# Patient Record
Sex: MALE | Race: WHITE | Employment: OTHER | URBAN - METROPOLITAN AREA
[De-identification: names, ages, dates, MRNs, and addresses within clinical notes are randomized per-mention and may not be internally consistent; named-entity substitution may affect disease eponyms.]

---

## 2019-08-13 ENCOUNTER — HOSPITAL ENCOUNTER (OUTPATIENT)
Dept: CT IMAGING | Facility: HOSPITAL | Age: 74
Discharge: HOME | End: 2019-08-13
Attending: FAMILY MEDICINE
Payer: MEDICARE

## 2019-08-13 ENCOUNTER — HOSPITAL ENCOUNTER (OUTPATIENT)
Dept: GENERAL RADIOLOGY | Facility: HOSPITAL | Age: 74
Discharge: HOME | End: 2019-08-13
Attending: FAMILY MEDICINE
Payer: MEDICARE

## 2019-08-13 DIAGNOSIS — M25.551 RIGHT HIP PAIN: ICD-10-CM

## 2019-08-13 DIAGNOSIS — R22.0 MANDIBULAR MASS: ICD-10-CM

## 2019-08-13 DIAGNOSIS — M48.07 SPINAL STENOSIS OF LUMBOSACRAL REGION: ICD-10-CM

## 2019-08-13 DIAGNOSIS — M54.50 ACUTE LOW BACK PAIN: ICD-10-CM

## 2019-08-13 DIAGNOSIS — R22.1 LOCALIZED SWELLING, MASS AND LUMP, NECK: ICD-10-CM

## 2019-08-13 LAB — CREAT BLD-MCNC: 1.1 MG/DL (ref 0.7–1.3)

## 2019-08-13 PROCEDURE — 70491 CT SOFT TISSUE NECK W/DYE: CPT | Performed by: FAMILY MEDICINE

## 2019-08-13 PROCEDURE — 73502 X-RAY EXAM HIP UNI 2-3 VIEWS: CPT | Performed by: FAMILY MEDICINE

## 2019-08-13 PROCEDURE — 72110 X-RAY EXAM L-2 SPINE 4/>VWS: CPT | Performed by: FAMILY MEDICINE

## 2019-08-13 PROCEDURE — 82565 ASSAY OF CREATININE: CPT

## 2019-08-15 PROBLEM — D12.3 BENIGN NEOPLASM OF TRANSVERSE COLON: Status: ACTIVE | Noted: 2019-08-15

## 2019-08-15 PROBLEM — K63.5 POLYP, SIGMOID COLON: Status: ACTIVE | Noted: 2019-08-15

## 2019-08-15 PROBLEM — D12.0 BENIGN NEOPLASM OF CECUM: Status: ACTIVE | Noted: 2019-08-15

## 2019-08-15 PROBLEM — D12.2 BENIGN NEOPLASM OF ASCENDING COLON: Status: ACTIVE | Noted: 2019-08-15

## 2019-08-15 PROBLEM — Z86.010 PERSONAL HISTORY OF COLONIC POLYPS: Status: ACTIVE | Noted: 2019-08-15

## 2019-11-13 ENCOUNTER — HOSPITAL ENCOUNTER (OUTPATIENT)
Dept: CV DIAGNOSTICS | Facility: HOSPITAL | Age: 74
Discharge: HOME | End: 2019-11-13
Attending: FAMILY MEDICINE
Payer: MEDICARE

## 2019-11-13 DIAGNOSIS — E78.5 HYPERLIPIDEMIA, UNSPECIFIED: ICD-10-CM

## 2019-11-13 DIAGNOSIS — R94.31 ABNORMAL EKG: ICD-10-CM

## 2019-11-13 PROCEDURE — 93018 CV STRESS TEST I&R ONLY: CPT | Performed by: FAMILY MEDICINE

## 2019-11-13 PROCEDURE — 93306 TTE W/DOPPLER COMPLETE: CPT | Performed by: FAMILY MEDICINE

## 2019-11-13 PROCEDURE — 93017 CV STRESS TEST TRACING ONLY: CPT | Performed by: FAMILY MEDICINE

## 2019-11-13 PROCEDURE — 78452 HT MUSCLE IMAGE SPECT MULT: CPT | Performed by: FAMILY MEDICINE

## 2019-11-15 ENCOUNTER — TELEPHONE (OUTPATIENT)
Dept: CARDIOLOGY | Age: 74
End: 2019-11-15

## 2019-11-21 RX ORDER — SIMVASTATIN 40 MG
40 TABLET ORAL
COMMUNITY
Start: 2016-06-07

## 2019-11-21 RX ORDER — METOPROLOL SUCCINATE 25 MG/1
25 TABLET, EXTENDED RELEASE ORAL
COMMUNITY
Start: 2016-06-07

## 2019-11-21 SDOH — HEALTH STABILITY: MENTAL HEALTH: HOW OFTEN DO YOU HAVE A DRINK CONTAINING ALCOHOL?: NEVER

## 2019-11-22 ENCOUNTER — OFFICE VISIT (OUTPATIENT)
Dept: CARDIOLOGY | Age: 74
End: 2019-11-22

## 2019-11-22 VITALS
DIASTOLIC BLOOD PRESSURE: 52 MMHG | HEIGHT: 71 IN | SYSTOLIC BLOOD PRESSURE: 100 MMHG | WEIGHT: 218 LBS | HEART RATE: 42 BPM | BODY MASS INDEX: 30.52 KG/M2

## 2019-11-22 DIAGNOSIS — E78.2 MIXED HYPERLIPIDEMIA: ICD-10-CM

## 2019-11-22 DIAGNOSIS — I10 ESSENTIAL HYPERTENSION: ICD-10-CM

## 2019-11-22 DIAGNOSIS — R94.39 ABNORMAL STRESS TEST: Primary | ICD-10-CM

## 2019-11-22 PROCEDURE — 99204 OFFICE O/P NEW MOD 45 MIN: CPT | Performed by: INTERNAL MEDICINE

## 2019-11-22 ASSESSMENT — PATIENT HEALTH QUESTIONNAIRE - PHQ9
SUM OF ALL RESPONSES TO PHQ9 QUESTIONS 1 AND 2: 0
2. FEELING DOWN, DEPRESSED OR HOPELESS: NOT AT ALL
1. LITTLE INTEREST OR PLEASURE IN DOING THINGS: NOT AT ALL
SUM OF ALL RESPONSES TO PHQ9 QUESTIONS 1 AND 2: 0

## 2019-11-22 ASSESSMENT — ENCOUNTER SYMPTOMS
CHILLS: 0
COUGH: 0
HEMOPTYSIS: 0
WEIGHT LOSS: 0
FEVER: 0
WEIGHT GAIN: 0
SUSPICIOUS LESIONS: 0
ALLERGIC/IMMUNOLOGIC COMMENTS: NO NEW FOOD ALLERGIES
HEMATOCHEZIA: 0
BRUISES/BLEEDS EASILY: 0

## 2019-12-02 ENCOUNTER — HOSPITAL ENCOUNTER (OUTPATIENT)
Dept: CT IMAGING | Facility: HOSPITAL | Age: 74
Discharge: HOME | End: 2019-12-02
Attending: INTERNAL MEDICINE
Payer: MEDICARE

## 2019-12-02 VITALS — RESPIRATION RATE: 16 BRPM | DIASTOLIC BLOOD PRESSURE: 62 MMHG | HEART RATE: 61 BPM | SYSTOLIC BLOOD PRESSURE: 118 MMHG

## 2019-12-02 DIAGNOSIS — E78.5 HYPERLIPIDEMIA: ICD-10-CM

## 2019-12-02 DIAGNOSIS — R94.39 ABNORMAL STRESS TEST: ICD-10-CM

## 2019-12-02 DIAGNOSIS — I10 ESSENTIAL HYPERTENSION: ICD-10-CM

## 2019-12-02 PROCEDURE — 82565 ASSAY OF CREATININE: CPT

## 2019-12-02 PROCEDURE — 75574 CT ANGIO HRT W/3D IMAGE: CPT | Performed by: INTERNAL MEDICINE

## 2019-12-02 PROCEDURE — 0502T CTA FRACTIONAL FLOW RESERVE ANALYSIS (CPT=0503T/0502T): CPT | Performed by: INTERNAL MEDICINE

## 2019-12-02 PROCEDURE — 0504T NONINVASIVE COR FFR CTA DATA REVIEW INTERPRETATION AND REPORT: CPT | Performed by: INTERNAL MEDICINE

## 2019-12-02 PROCEDURE — 0503T CTA FRACTIONAL FLOW RESERVE ANALYSIS (CPT=0503T/0502T): CPT | Performed by: INTERNAL MEDICINE

## 2019-12-02 RX ORDER — NITROGLYCERIN 0.4 MG/1
0.4 TABLET SUBLINGUAL ONCE
Status: COMPLETED | OUTPATIENT
Start: 2019-12-02 | End: 2019-12-02

## 2019-12-02 RX ORDER — METOPROLOL TARTRATE 5 MG/5ML
INJECTION INTRAVENOUS
Status: DISCONTINUED
Start: 2019-12-02 | End: 2019-12-02 | Stop reason: WASHOUT

## 2019-12-02 RX ORDER — NITROGLYCERIN 0.4 MG/1
TABLET SUBLINGUAL
Status: COMPLETED
Start: 2019-12-02 | End: 2019-12-02

## 2019-12-02 RX ADMIN — NITROGLYCERIN 0.4 MG: 0.4 TABLET SUBLINGUAL at 10:36:00

## 2019-12-02 NOTE — IMAGING NOTE
104 cc's Contrast  138 cc's Saline     Avg HR for CTA Gated = 58  Pt tolerated CTA Gated study well.

## 2019-12-09 ENCOUNTER — TELEPHONE (OUTPATIENT)
Dept: CARDIOLOGY | Age: 74
End: 2019-12-09

## 2020-04-28 ENCOUNTER — TELEPHONE (OUTPATIENT)
Dept: CARDIOLOGY | Age: 75
End: 2020-04-28

## 2020-05-27 ENCOUNTER — OFFICE VISIT (OUTPATIENT)
Dept: CARDIOLOGY | Age: 75
End: 2020-05-27

## 2020-05-27 VITALS — WEIGHT: 201 LBS | BODY MASS INDEX: 28.14 KG/M2 | HEIGHT: 71 IN

## 2020-05-27 DIAGNOSIS — E78.2 MIXED HYPERLIPIDEMIA: Primary | ICD-10-CM

## 2020-05-27 DIAGNOSIS — I10 ESSENTIAL HYPERTENSION: ICD-10-CM

## 2020-05-27 PROCEDURE — 99442 TELEPHONE E&M BY PHYSICIAN EST PT NOT ORIG PREV 7 DAYS 11-20 MIN: CPT | Performed by: INTERNAL MEDICINE

## 2020-05-27 RX ORDER — UBIDECARENONE 75 MG
50 CAPSULE ORAL DAILY
COMMUNITY

## 2020-05-27 RX ORDER — AMOXICILLIN 500 MG
1200 CAPSULE ORAL 2 TIMES DAILY
COMMUNITY

## 2020-05-27 SDOH — HEALTH STABILITY: MENTAL HEALTH: HOW OFTEN DO YOU HAVE A DRINK CONTAINING ALCOHOL?: NEVER

## 2020-05-27 SDOH — HEALTH STABILITY: PHYSICAL HEALTH: ON AVERAGE, HOW MANY DAYS PER WEEK DO YOU ENGAGE IN MODERATE TO STRENUOUS EXERCISE (LIKE A BRISK WALK)?: 2 DAYS

## 2020-05-27 ASSESSMENT — PATIENT HEALTH QUESTIONNAIRE - PHQ9
SUM OF ALL RESPONSES TO PHQ9 QUESTIONS 1 AND 2: 0
CLINICAL INTERPRETATION OF PHQ2 SCORE: NO FURTHER SCREENING NEEDED
SUM OF ALL RESPONSES TO PHQ9 QUESTIONS 1 AND 2: 0
CLINICAL INTERPRETATION OF PHQ9 SCORE: NO FURTHER SCREENING NEEDED
1. LITTLE INTEREST OR PLEASURE IN DOING THINGS: NOT AT ALL
2. FEELING DOWN, DEPRESSED OR HOPELESS: NOT AT ALL

## 2020-11-03 ENCOUNTER — LAB ENCOUNTER (OUTPATIENT)
Dept: LAB | Facility: HOSPITAL | Age: 75
End: 2020-11-03
Attending: INTERNAL MEDICINE
Payer: MEDICARE

## 2020-11-03 DIAGNOSIS — E78.2 MIXED HYPERLIPIDEMIA: Primary | ICD-10-CM

## 2020-11-03 DIAGNOSIS — I10 ESSENTIAL HYPERTENSION, MALIGNANT: ICD-10-CM

## 2020-11-03 PROCEDURE — 36415 COLL VENOUS BLD VENIPUNCTURE: CPT

## 2020-11-03 PROCEDURE — 80053 COMPREHEN METABOLIC PANEL: CPT

## 2020-11-03 PROCEDURE — 80061 LIPID PANEL: CPT

## 2020-11-04 ENCOUNTER — OFFICE VISIT (OUTPATIENT)
Dept: CARDIOLOGY | Age: 75
End: 2020-11-04

## 2020-11-04 VITALS
WEIGHT: 205 LBS | DIASTOLIC BLOOD PRESSURE: 60 MMHG | BODY MASS INDEX: 28.59 KG/M2 | SYSTOLIC BLOOD PRESSURE: 110 MMHG | HEART RATE: 62 BPM

## 2020-11-04 DIAGNOSIS — E78.2 MIXED HYPERLIPIDEMIA: ICD-10-CM

## 2020-11-04 DIAGNOSIS — I10 ESSENTIAL HYPERTENSION: ICD-10-CM

## 2020-11-04 DIAGNOSIS — I25.10 CORONARY ARTERY DISEASE INVOLVING NATIVE CORONARY ARTERY OF NATIVE HEART WITHOUT ANGINA PECTORIS: Primary | ICD-10-CM

## 2020-11-04 PROCEDURE — 99214 OFFICE O/P EST MOD 30 MIN: CPT | Performed by: INTERNAL MEDICINE

## 2020-11-04 SDOH — HEALTH STABILITY: PHYSICAL HEALTH: ON AVERAGE, HOW MANY DAYS PER WEEK DO YOU ENGAGE IN MODERATE TO STRENUOUS EXERCISE (LIKE A BRISK WALK)?: 0 DAYS

## 2020-11-04 SDOH — HEALTH STABILITY: MENTAL HEALTH: HOW OFTEN DO YOU HAVE A DRINK CONTAINING ALCOHOL?: NEVER

## 2020-11-04 ASSESSMENT — PATIENT HEALTH QUESTIONNAIRE - PHQ9
CLINICAL INTERPRETATION OF PHQ2 SCORE: NO FURTHER SCREENING NEEDED
SUM OF ALL RESPONSES TO PHQ9 QUESTIONS 1 AND 2: 0
SUM OF ALL RESPONSES TO PHQ9 QUESTIONS 1 AND 2: 0
2. FEELING DOWN, DEPRESSED OR HOPELESS: NOT AT ALL
CLINICAL INTERPRETATION OF PHQ9 SCORE: NO FURTHER SCREENING NEEDED
1. LITTLE INTEREST OR PLEASURE IN DOING THINGS: NOT AT ALL

## 2020-11-04 ASSESSMENT — ENCOUNTER SYMPTOMS
COUGH: 0
HEMOPTYSIS: 0
BRUISES/BLEEDS EASILY: 0
WEIGHT GAIN: 0
FEVER: 0
CHILLS: 0
ALLERGIC/IMMUNOLOGIC COMMENTS: NO NEW FOOD ALLERGIES
SUSPICIOUS LESIONS: 0
WEIGHT LOSS: 0
HEMATOCHEZIA: 0

## 2021-06-24 RX ORDER — MELATONIN
1000 DAILY
COMMUNITY

## 2021-06-24 RX ORDER — NIACIN 100 MG
100 TABLET ORAL NIGHTLY
COMMUNITY

## 2021-06-24 RX ORDER — CHLORAL HYDRATE 500 MG
1000 CAPSULE ORAL DAILY
COMMUNITY

## 2021-06-24 RX ORDER — GLUCOSAM/CHON-MSM1/C/MANG/BOSW 750-644 MG
1 TABLET ORAL DAILY
COMMUNITY

## 2021-06-24 RX ORDER — FENOFIBRATE 145 MG/1
145 TABLET, COATED ORAL DAILY
COMMUNITY

## 2021-06-24 RX ORDER — MULTIVIT-MIN/IRON FUM/FOLIC AC 7.5 MG-4
1 TABLET ORAL DAILY
COMMUNITY

## 2021-06-24 NOTE — PAT NURSING NOTE
Left message for Ni Amin at Dr. Kat Camargo office as surgical case request is missing Dr. Kat Camargo usual orders for Celebrex and Zofran prior to procedure. Await updated case request or return phone call.

## 2021-07-14 ENCOUNTER — HOSPITAL ENCOUNTER (OUTPATIENT)
Dept: PHYSICAL THERAPY | Facility: HOSPITAL | Age: 76
Discharge: HOME | End: 2021-07-14
Attending: ORTHOPAEDIC SURGERY
Payer: MEDICARE

## 2021-07-14 DIAGNOSIS — M48.061 SPINAL STENOSIS OF LUMBAR REGION WITHOUT NEUROGENIC CLAUDICATION: ICD-10-CM

## 2021-07-14 DIAGNOSIS — M48.00 SPINAL STENOSIS: ICD-10-CM

## 2021-07-26 ENCOUNTER — LAB ENCOUNTER (OUTPATIENT)
Dept: LAB | Facility: HOSPITAL | Age: 76
DRG: 455 | End: 2021-07-26
Attending: ORTHOPAEDIC SURGERY
Payer: MEDICARE

## 2021-07-26 DIAGNOSIS — M48.00 SPINAL STENOSIS: ICD-10-CM

## 2021-07-27 LAB — SARS-COV-2 RNA RESP QL NAA+PROBE: NOT DETECTED

## 2021-07-27 RX ORDER — CEFAZOLIN SODIUM/WATER 2 G/20 ML
2 SYRINGE (ML) INTRAVENOUS ONCE
Status: CANCELLED | OUTPATIENT
Start: 2021-07-27 | End: 2021-07-27

## 2021-07-28 ENCOUNTER — ANESTHESIA (OUTPATIENT)
Dept: SURGERY | Facility: HOSPITAL | Age: 76
DRG: 455 | End: 2021-07-28
Payer: MEDICARE

## 2021-07-28 ENCOUNTER — APPOINTMENT (OUTPATIENT)
Dept: GENERAL RADIOLOGY | Facility: HOSPITAL | Age: 76
DRG: 455 | End: 2021-07-28
Attending: ORTHOPAEDIC SURGERY
Payer: MEDICARE

## 2021-07-28 ENCOUNTER — HOSPITAL ENCOUNTER (INPATIENT)
Facility: HOSPITAL | Age: 76
LOS: 1 days | Discharge: HOME | DRG: 455 | End: 2021-07-29
Attending: ORTHOPAEDIC SURGERY | Admitting: ORTHOPAEDIC SURGERY
Payer: MEDICARE

## 2021-07-28 ENCOUNTER — ANESTHESIA EVENT (OUTPATIENT)
Dept: SURGERY | Facility: HOSPITAL | Age: 76
DRG: 455 | End: 2021-07-28
Payer: MEDICARE

## 2021-07-28 DIAGNOSIS — M48.061 SPINAL STENOSIS OF LUMBAR REGION WITHOUT NEUROGENIC CLAUDICATION: ICD-10-CM

## 2021-07-28 DIAGNOSIS — M48.00 SPINAL STENOSIS: Primary | ICD-10-CM

## 2021-07-28 DIAGNOSIS — M48.061 SPINAL STENOSIS OF LUMBAR REGION, UNSPECIFIED WHETHER NEUROGENIC CLAUDICATION PRESENT: ICD-10-CM

## 2021-07-28 DIAGNOSIS — M43.16 SPONDYLOLISTHESIS OF LUMBAR REGION: ICD-10-CM

## 2021-07-28 PROCEDURE — 0SG1071 FUSION OF 2 OR MORE LUMBAR VERTEBRAL JOINTS WITH AUTOLOGOUS TISSUE SUBSTITUTE, POSTERIOR APPROACH, POSTERIOR COLUMN, OPEN APPROACH: ICD-10-PCS | Performed by: ORTHOPAEDIC SURGERY

## 2021-07-28 PROCEDURE — 95940 IONM IN OPERATNG ROOM 15 MIN: CPT | Performed by: ORTHOPAEDIC SURGERY

## 2021-07-28 PROCEDURE — 95861 NEEDLE EMG 2 EXTREMITIES: CPT | Performed by: ORTHOPAEDIC SURGERY

## 2021-07-28 PROCEDURE — 0SG30AJ FUSION OF LUMBOSACRAL JOINT WITH INTERBODY FUSION DEVICE, POSTERIOR APPROACH, ANTERIOR COLUMN, OPEN APPROACH: ICD-10-PCS | Performed by: ORTHOPAEDIC SURGERY

## 2021-07-28 PROCEDURE — 95938 SOMATOSENSORY TESTING: CPT | Performed by: ORTHOPAEDIC SURGERY

## 2021-07-28 PROCEDURE — 0SB40ZZ EXCISION OF LUMBOSACRAL DISC, OPEN APPROACH: ICD-10-PCS | Performed by: ORTHOPAEDIC SURGERY

## 2021-07-28 PROCEDURE — 76000 FLUOROSCOPY <1 HR PHYS/QHP: CPT | Performed by: ORTHOPAEDIC SURGERY

## 2021-07-28 PROCEDURE — 4A11X4G MONITORING OF PERIPHERAL NERVOUS ELECTRICAL ACTIVITY, INTRAOPERATIVE, EXTERNAL APPROACH: ICD-10-PCS | Performed by: ORTHOPAEDIC SURGERY

## 2021-07-28 PROCEDURE — 0SG00AJ FUSION OF LUMBAR VERTEBRAL JOINT WITH INTERBODY FUSION DEVICE, POSTERIOR APPROACH, ANTERIOR COLUMN, OPEN APPROACH: ICD-10-PCS | Performed by: ORTHOPAEDIC SURGERY

## 2021-07-28 PROCEDURE — 0SB20ZZ EXCISION OF LUMBAR VERTEBRAL DISC, OPEN APPROACH: ICD-10-PCS | Performed by: ORTHOPAEDIC SURGERY

## 2021-07-28 DEVICE — RELINE MAS TI ROD 5.5X75 LRDTC: Type: IMPLANTABLE DEVICE | Site: BACK | Status: FUNCTIONAL

## 2021-07-28 DEVICE — OSTEOCEL PRO LARGE BULK BUY: Type: IMPLANTABLE DEVICE | Site: BACK | Status: FUNCTIONAL

## 2021-07-28 DEVICE — COHERE TLIF-O, 14X10X30MM 12°
Type: IMPLANTABLE DEVICE | Site: BACK | Status: FUNCTIONAL
Brand: COHERE

## 2021-07-28 DEVICE — RELINE LCK SCRW 5.5 OPEN TULIP: Type: IMPLANTABLE DEVICE | Site: BACK | Status: FUNCTIONAL

## 2021-07-28 DEVICE — RELINE MAS RED SCRW 6.5X50 2C: Type: IMPLANTABLE DEVICE | Site: BACK | Status: FUNCTIONAL

## 2021-07-28 DEVICE — RELINE MAS TI ROD 5.5X65 LRDTC: Type: IMPLANTABLE DEVICE | Site: BACK | Status: FUNCTIONAL

## 2021-07-28 DEVICE — MODULUS TLIF-O, 12X10X30MM 12°
Type: IMPLANTABLE DEVICE | Site: BACK | Status: FUNCTIONAL
Brand: MODULUS

## 2021-07-28 DEVICE — RELINE MAS MOD REDUCTION EXT: Type: IMPLANTABLE DEVICE | Site: BACK | Status: FUNCTIONAL

## 2021-07-28 DEVICE — RELINE MAS MOD SCREW 6.5X50 2C: Type: IMPLANTABLE DEVICE | Site: BACK | Status: FUNCTIONAL

## 2021-07-28 RX ORDER — SODIUM PHOSPHATE, DIBASIC AND SODIUM PHOSPHATE, MONOBASIC 7; 19 G/133ML; G/133ML
1 ENEMA RECTAL ONCE AS NEEDED
Status: DISCONTINUED | OUTPATIENT
Start: 2021-07-28 | End: 2021-07-29

## 2021-07-28 RX ORDER — HYDROMORPHONE HYDROCHLORIDE 1 MG/ML
0.4 INJECTION, SOLUTION INTRAMUSCULAR; INTRAVENOUS; SUBCUTANEOUS EVERY 5 MIN PRN
Status: DISCONTINUED | OUTPATIENT
Start: 2021-07-28 | End: 2021-07-28 | Stop reason: HOSPADM

## 2021-07-28 RX ORDER — CELECOXIB 200 MG/1
CAPSULE ORAL
Status: COMPLETED
Start: 2021-07-28 | End: 2021-07-28

## 2021-07-28 RX ORDER — POLYETHYLENE GLYCOL 3350 17 G/17G
17 POWDER, FOR SOLUTION ORAL DAILY PRN
Status: DISCONTINUED | OUTPATIENT
Start: 2021-07-28 | End: 2021-07-29

## 2021-07-28 RX ORDER — DIPHENHYDRAMINE HYDROCHLORIDE 50 MG/ML
12.5 INJECTION INTRAMUSCULAR; INTRAVENOUS AS NEEDED
Status: DISCONTINUED | OUTPATIENT
Start: 2021-07-28 | End: 2021-07-28 | Stop reason: HOSPADM

## 2021-07-28 RX ORDER — DIAZEPAM 2 MG/1
2 TABLET ORAL EVERY 6 HOURS PRN
Status: DISCONTINUED | OUTPATIENT
Start: 2021-07-28 | End: 2021-07-29

## 2021-07-28 RX ORDER — ATORVASTATIN CALCIUM 20 MG/1
20 TABLET, FILM COATED ORAL NIGHTLY
Status: DISCONTINUED | OUTPATIENT
Start: 2021-07-28 | End: 2021-07-29

## 2021-07-28 RX ORDER — NALOXONE HYDROCHLORIDE 0.4 MG/ML
80 INJECTION, SOLUTION INTRAMUSCULAR; INTRAVENOUS; SUBCUTANEOUS AS NEEDED
Status: DISCONTINUED | OUTPATIENT
Start: 2021-07-28 | End: 2021-07-28 | Stop reason: HOSPADM

## 2021-07-28 RX ORDER — SODIUM CHLORIDE 9 MG/ML
INJECTION, SOLUTION INTRAVENOUS CONTINUOUS
Status: DISCONTINUED | OUTPATIENT
Start: 2021-07-28 | End: 2021-07-29

## 2021-07-28 RX ORDER — DIPHENHYDRAMINE HYDROCHLORIDE 50 MG/ML
25 INJECTION INTRAMUSCULAR; INTRAVENOUS EVERY 4 HOURS PRN
Status: DISCONTINUED | OUTPATIENT
Start: 2021-07-28 | End: 2021-07-29

## 2021-07-28 RX ORDER — OXYCODONE HYDROCHLORIDE 5 MG/1
5 TABLET ORAL EVERY 4 HOURS PRN
Status: DISCONTINUED | OUTPATIENT
Start: 2021-07-28 | End: 2021-07-29

## 2021-07-28 RX ORDER — ONDANSETRON 2 MG/ML
INJECTION INTRAMUSCULAR; INTRAVENOUS
Status: COMPLETED
Start: 2021-07-28 | End: 2021-07-28

## 2021-07-28 RX ORDER — ONDANSETRON 2 MG/ML
INJECTION INTRAMUSCULAR; INTRAVENOUS AS NEEDED
Status: DISCONTINUED | OUTPATIENT
Start: 2021-07-28 | End: 2021-07-28 | Stop reason: SURG

## 2021-07-28 RX ORDER — DEXAMETHASONE SODIUM PHOSPHATE 4 MG/ML
VIAL (ML) INJECTION AS NEEDED
Status: DISCONTINUED | OUTPATIENT
Start: 2021-07-28 | End: 2021-07-28 | Stop reason: SURG

## 2021-07-28 RX ORDER — ACETAMINOPHEN 10 MG/ML
INJECTION, SOLUTION INTRAVENOUS AS NEEDED
Status: DISCONTINUED | OUTPATIENT
Start: 2021-07-28 | End: 2021-07-28 | Stop reason: SURG

## 2021-07-28 RX ORDER — HYDROMORPHONE HYDROCHLORIDE 1 MG/ML
0.2 INJECTION, SOLUTION INTRAMUSCULAR; INTRAVENOUS; SUBCUTANEOUS EVERY 2 HOUR PRN
Status: DISCONTINUED | OUTPATIENT
Start: 2021-07-28 | End: 2021-07-29

## 2021-07-28 RX ORDER — HYDROMORPHONE HYDROCHLORIDE 1 MG/ML
INJECTION, SOLUTION INTRAMUSCULAR; INTRAVENOUS; SUBCUTANEOUS
Status: COMPLETED
Start: 2021-07-28 | End: 2021-07-28

## 2021-07-28 RX ORDER — HYDROMORPHONE HYDROCHLORIDE 1 MG/ML
0.4 INJECTION, SOLUTION INTRAMUSCULAR; INTRAVENOUS; SUBCUTANEOUS EVERY 2 HOUR PRN
Status: DISCONTINUED | OUTPATIENT
Start: 2021-07-28 | End: 2021-07-29

## 2021-07-28 RX ORDER — HYDROMORPHONE HYDROCHLORIDE 1 MG/ML
0.8 INJECTION, SOLUTION INTRAMUSCULAR; INTRAVENOUS; SUBCUTANEOUS EVERY 2 HOUR PRN
Status: DISCONTINUED | OUTPATIENT
Start: 2021-07-28 | End: 2021-07-29

## 2021-07-28 RX ORDER — OXYCODONE HYDROCHLORIDE 10 MG/1
10 TABLET ORAL EVERY 4 HOURS PRN
Status: DISCONTINUED | OUTPATIENT
Start: 2021-07-28 | End: 2021-07-29

## 2021-07-28 RX ORDER — SODIUM CHLORIDE, SODIUM LACTATE, POTASSIUM CHLORIDE, CALCIUM CHLORIDE 600; 310; 30; 20 MG/100ML; MG/100ML; MG/100ML; MG/100ML
INJECTION, SOLUTION INTRAVENOUS CONTINUOUS
Status: DISCONTINUED | OUTPATIENT
Start: 2021-07-28 | End: 2021-07-28

## 2021-07-28 RX ORDER — DOCUSATE SODIUM 100 MG/1
100 CAPSULE, LIQUID FILLED ORAL 2 TIMES DAILY
Status: DISCONTINUED | OUTPATIENT
Start: 2021-07-28 | End: 2021-07-29

## 2021-07-28 RX ORDER — ONDANSETRON 2 MG/ML
4 INJECTION INTRAMUSCULAR; INTRAVENOUS ONCE
Status: COMPLETED | OUTPATIENT
Start: 2021-07-28 | End: 2021-07-28

## 2021-07-28 RX ORDER — DIPHENHYDRAMINE HCL 25 MG
25 CAPSULE ORAL EVERY 4 HOURS PRN
Status: DISCONTINUED | OUTPATIENT
Start: 2021-07-28 | End: 2021-07-29

## 2021-07-28 RX ORDER — VANCOMYCIN HYDROCHLORIDE 1 G/20ML
INJECTION, POWDER, LYOPHILIZED, FOR SOLUTION INTRAVENOUS AS NEEDED
Status: DISCONTINUED | OUTPATIENT
Start: 2021-07-28 | End: 2021-07-28 | Stop reason: HOSPADM

## 2021-07-28 RX ORDER — KETAMINE HYDROCHLORIDE 50 MG/ML
INJECTION, SOLUTION, CONCENTRATE INTRAMUSCULAR; INTRAVENOUS AS NEEDED
Status: DISCONTINUED | OUTPATIENT
Start: 2021-07-28 | End: 2021-07-28 | Stop reason: SURG

## 2021-07-28 RX ORDER — OXYCODONE HYDROCHLORIDE 5 MG/1
10 TABLET ORAL ONCE AS NEEDED
Status: DISCONTINUED | OUTPATIENT
Start: 2021-07-28 | End: 2021-07-28 | Stop reason: HOSPADM

## 2021-07-28 RX ORDER — ACETAMINOPHEN 325 MG/1
650 TABLET ORAL EVERY 4 HOURS PRN
Status: DISCONTINUED | OUTPATIENT
Start: 2021-07-28 | End: 2021-07-29

## 2021-07-28 RX ORDER — ONDANSETRON 2 MG/ML
4 INJECTION INTRAMUSCULAR; INTRAVENOUS EVERY 4 HOURS PRN
Status: ACTIVE | OUTPATIENT
Start: 2021-07-28 | End: 2021-07-29

## 2021-07-28 RX ORDER — CELECOXIB 200 MG/1
200 CAPSULE ORAL ONCE
Status: COMPLETED | OUTPATIENT
Start: 2021-07-28 | End: 2021-07-28

## 2021-07-28 RX ORDER — METOPROLOL SUCCINATE 25 MG/1
25 TABLET, EXTENDED RELEASE ORAL
Status: DISCONTINUED | OUTPATIENT
Start: 2021-07-29 | End: 2021-07-29

## 2021-07-28 RX ORDER — BISACODYL 10 MG
10 SUPPOSITORY, RECTAL RECTAL
Status: DISCONTINUED | OUTPATIENT
Start: 2021-07-28 | End: 2021-07-29

## 2021-07-28 RX ORDER — CEFAZOLIN SODIUM/WATER 2 G/20 ML
2 SYRINGE (ML) INTRAVENOUS EVERY 8 HOURS
Status: COMPLETED | OUTPATIENT
Start: 2021-07-28 | End: 2021-07-28

## 2021-07-28 RX ORDER — EPHEDRINE SULFATE 50 MG/ML
INJECTION INTRAVENOUS AS NEEDED
Status: DISCONTINUED | OUTPATIENT
Start: 2021-07-28 | End: 2021-07-28 | Stop reason: SURG

## 2021-07-28 RX ORDER — FENOFIBRATE 134 MG/1
134 CAPSULE ORAL
Status: DISCONTINUED | OUTPATIENT
Start: 2021-07-29 | End: 2021-07-29

## 2021-07-28 RX ORDER — MIDAZOLAM HYDROCHLORIDE 1 MG/ML
INJECTION INTRAMUSCULAR; INTRAVENOUS
Status: COMPLETED
Start: 2021-07-28 | End: 2021-07-28

## 2021-07-28 RX ORDER — BUPIVACAINE HYDROCHLORIDE AND EPINEPHRINE 5; 5 MG/ML; UG/ML
INJECTION, SOLUTION EPIDURAL; INTRACAUDAL; PERINEURAL AS NEEDED
Status: DISCONTINUED | OUTPATIENT
Start: 2021-07-28 | End: 2021-07-28 | Stop reason: HOSPADM

## 2021-07-28 RX ORDER — REMIFENTANIL HYDROCHLORIDE 1 MG/ML
INJECTION, POWDER, LYOPHILIZED, FOR SOLUTION INTRAVENOUS AS NEEDED
Status: DISCONTINUED | OUTPATIENT
Start: 2021-07-28 | End: 2021-07-28 | Stop reason: SURG

## 2021-07-28 RX ORDER — LIDOCAINE HYDROCHLORIDE 10 MG/ML
INJECTION, SOLUTION EPIDURAL; INFILTRATION; INTRACAUDAL; PERINEURAL AS NEEDED
Status: DISCONTINUED | OUTPATIENT
Start: 2021-07-28 | End: 2021-07-28 | Stop reason: SURG

## 2021-07-28 RX ORDER — SENNOSIDES 8.6 MG
17.2 TABLET ORAL NIGHTLY
Status: DISCONTINUED | OUTPATIENT
Start: 2021-07-28 | End: 2021-07-29

## 2021-07-28 RX ORDER — ACETAMINOPHEN 500 MG
1000 TABLET ORAL ONCE
Status: DISCONTINUED | OUTPATIENT
Start: 2021-07-28 | End: 2021-07-28 | Stop reason: HOSPADM

## 2021-07-28 RX ORDER — PROCHLORPERAZINE EDISYLATE 5 MG/ML
10 INJECTION INTRAMUSCULAR; INTRAVENOUS EVERY 6 HOURS PRN
Status: DISCONTINUED | OUTPATIENT
Start: 2021-07-28 | End: 2021-07-29

## 2021-07-28 RX ORDER — CEFAZOLIN SODIUM/WATER 2 G/20 ML
2 SYRINGE (ML) INTRAVENOUS ONCE
Status: COMPLETED | OUTPATIENT
Start: 2021-07-28 | End: 2021-07-28

## 2021-07-28 RX ORDER — CEFAZOLIN SODIUM/WATER 2 G/20 ML
SYRINGE (ML) INTRAVENOUS
Status: DISPENSED
Start: 2021-07-28 | End: 2021-07-28

## 2021-07-28 RX ORDER — ONDANSETRON 2 MG/ML
4 INJECTION INTRAMUSCULAR; INTRAVENOUS AS NEEDED
Status: DISCONTINUED | OUTPATIENT
Start: 2021-07-28 | End: 2021-07-28 | Stop reason: HOSPADM

## 2021-07-28 RX ORDER — MIDAZOLAM HYDROCHLORIDE 1 MG/ML
1 INJECTION INTRAMUSCULAR; INTRAVENOUS EVERY 5 MIN PRN
Status: DISCONTINUED | OUTPATIENT
Start: 2021-07-28 | End: 2021-07-28 | Stop reason: HOSPADM

## 2021-07-28 RX ORDER — METHOCARBAMOL 500 MG/1
250 TABLET, FILM COATED ORAL EVERY 6 HOURS PRN
Status: DISCONTINUED | OUTPATIENT
Start: 2021-07-28 | End: 2021-07-29

## 2021-07-28 RX ORDER — LABETALOL HYDROCHLORIDE 5 MG/ML
5 INJECTION, SOLUTION INTRAVENOUS EVERY 5 MIN PRN
Status: DISCONTINUED | OUTPATIENT
Start: 2021-07-28 | End: 2021-07-28 | Stop reason: HOSPADM

## 2021-07-28 RX ORDER — MEPERIDINE HYDROCHLORIDE 25 MG/ML
12.5 INJECTION INTRAMUSCULAR; INTRAVENOUS; SUBCUTANEOUS AS NEEDED
Status: DISCONTINUED | OUTPATIENT
Start: 2021-07-28 | End: 2021-07-28 | Stop reason: HOSPADM

## 2021-07-28 RX ORDER — OXYCODONE HYDROCHLORIDE 5 MG/1
5 TABLET ORAL ONCE AS NEEDED
Status: DISCONTINUED | OUTPATIENT
Start: 2021-07-28 | End: 2021-07-28 | Stop reason: HOSPADM

## 2021-07-28 RX ORDER — SODIUM CHLORIDE, SODIUM LACTATE, POTASSIUM CHLORIDE, CALCIUM CHLORIDE 600; 310; 30; 20 MG/100ML; MG/100ML; MG/100ML; MG/100ML
INJECTION, SOLUTION INTRAVENOUS CONTINUOUS
Status: DISCONTINUED | OUTPATIENT
Start: 2021-07-28 | End: 2021-07-28 | Stop reason: HOSPADM

## 2021-07-28 RX ORDER — ROCURONIUM BROMIDE 10 MG/ML
INJECTION, SOLUTION INTRAVENOUS AS NEEDED
Status: DISCONTINUED | OUTPATIENT
Start: 2021-07-28 | End: 2021-07-28 | Stop reason: SURG

## 2021-07-28 RX ADMIN — LIDOCAINE HYDROCHLORIDE 50 MG: 10 INJECTION, SOLUTION EPIDURAL; INFILTRATION; INTRACAUDAL; PERINEURAL at 07:36:00

## 2021-07-28 RX ADMIN — ONDANSETRON 4 MG: 2 INJECTION INTRAMUSCULAR; INTRAVENOUS at 10:35:00

## 2021-07-28 RX ADMIN — CEFAZOLIN SODIUM/WATER 2 G: 2 G/20 ML SYRINGE (ML) INTRAVENOUS at 07:52:00

## 2021-07-28 RX ADMIN — DEXAMETHASONE SODIUM PHOSPHATE 8 MG: 4 MG/ML VIAL (ML) INJECTION at 07:44:00

## 2021-07-28 RX ADMIN — EPHEDRINE SULFATE 10 MG: 50 INJECTION INTRAVENOUS at 07:56:00

## 2021-07-28 RX ADMIN — REMIFENTANIL HYDROCHLORIDE 100 MCG: 1 INJECTION, POWDER, LYOPHILIZED, FOR SOLUTION INTRAVENOUS at 07:36:00

## 2021-07-28 RX ADMIN — SODIUM CHLORIDE, SODIUM LACTATE, POTASSIUM CHLORIDE, CALCIUM CHLORIDE: 600; 310; 30; 20 INJECTION, SOLUTION INTRAVENOUS at 07:33:00

## 2021-07-28 RX ADMIN — KETAMINE HYDROCHLORIDE 10 MG: 50 INJECTION, SOLUTION, CONCENTRATE INTRAMUSCULAR; INTRAVENOUS at 08:00:00

## 2021-07-28 RX ADMIN — ACETAMINOPHEN 1000 MG: 10 INJECTION, SOLUTION INTRAVENOUS at 10:25:00

## 2021-07-28 RX ADMIN — ROCURONIUM BROMIDE 5 MG: 10 INJECTION, SOLUTION INTRAVENOUS at 07:36:00

## 2021-07-28 RX ADMIN — EPHEDRINE SULFATE 10 MG: 50 INJECTION INTRAVENOUS at 09:40:00

## 2021-07-28 NOTE — BRIEF OP NOTE
Pre-Operative Diagnosis: Spondylolisthesis of lumbar region [M43.16]  Spinal stenosis of lumbar region, unspecified whether neurogenic claudication present [M48.061]     Post-Operative Diagnosis: * No post-op diagnosis entered *      Procedure Performed:

## 2021-07-28 NOTE — INTERVAL H&P NOTE
Pre-op Diagnosis: Spondylolisthesis of lumbar region [M43.16]  Spinal stenosis of lumbar region, unspecified whether neurogenic claudication present [M48.061]    The above referenced H&P was reviewed by Sanjana Prasad MD on 7/28/2021, the patient was exami

## 2021-07-28 NOTE — ANESTHESIA PROCEDURE NOTES
Airway  Date/Time: 7/28/2021 7:40 AM  Urgency: elective      General Information and Staff    Patient location during procedure: OR  Anesthesiologist: Carla Howe MD  Resident/CRNA: Odalis Odom CRNA  Performed: CRNA     Indications and Patient

## 2021-07-28 NOTE — PLAN OF CARE
Post op plan of care discussed with patient at bedside. Back dressing clean dry and intact. Gel ice pack on. Scd's on. Molina draining clear yellow urine. IVF infusing as ordered. Safety precautions discussed with patient and spouse Jesus Moyer at bedside.  Denies

## 2021-07-28 NOTE — ANESTHESIA PROCEDURE NOTES
Peripheral IV  Date/Time: 7/28/2021 8:15 AM  Inserted by: Jayna Egan CRNA    Placement  Needle size: 18 G  Laterality: right  Location: hand  Local anesthetic: none  Site prep: alcohol  Attempts: 1

## 2021-07-28 NOTE — PROGRESS NOTES
S: Moderate back pain with no leg pain. No new numbness or weakness. No headaches. He is about to go up to the floor. Inspection:  Awake alert No acute distress.  No difficulty breathing     Blood pressure 131/72, pulse 80, temperature 97.4 °F (36.3 °

## 2021-07-28 NOTE — ANESTHESIA POSTPROCEDURE EVALUATION
Mariajose Patient Status:  Inpatient   Age/Gender 66year old male MRN [id number]   [facility] SURGERY Attending Sera Perez MD   Hosp Day # 0 PCP Paty Dos Santos MD       Anesthesia Post-op Note    LUMBAR 3,

## 2021-07-28 NOTE — CONSULTS
[facility] Hospitalist Initial Consult      Reason for consult: Medical Management sp L3-3 TLIF w L5-S1 decompression      History of Present Illness: Patient is a 66year old male with PMH sig for  HTN,  HL who presents sp above surgery.    They tolerated the pro Neck: Supple, symmetrical, trachea midline, no cervical or supraclavicular lymph adenopathy, thyroid: no enlargment/tenderness/nodules appreciated   Lungs:   Clear to auscultation bilaterally. Normal effort   Chest wall:  No tenderness or deformity.    He

## 2021-07-28 NOTE — ANESTHESIA PREPROCEDURE EVALUATION
PRE-OP EVALUATION    Patient Name: Stacie Mars    Admit Diagnosis: Spondylolisthesis of lumbar region [M43.16]  Spinal stenosis of lumbar region, unspecified whether neurogenic claudication present [M48.061]    Pre-op Diagnosis: Spondylolisthesis of time  Vitamin B-12 1000 MCG Oral Tab, Take 1,000 mcg by mouth daily. , Disp: , Rfl: , Past Week at Unknown time  Glucosamine-Chondroitin (OSTEO BI-FLEX REGULAR STRENGTH) 250-200 MG Oral Tab, Take 1 tablet by mouth daily. , Disp: , Rfl: , Past Week at Unknown Airway      Mallampati: II  Mouth opening: >3 FB  TM distance: > 6 cm  Neck ROM: full Cardiovascular      Rhythm: regular  Rate: normal  (-) murmur   Dental  Comment: Dentition is grossly intact;   Patient does not demonstrate loose teeth to inspectio

## 2021-07-29 VITALS
DIASTOLIC BLOOD PRESSURE: 65 MMHG | RESPIRATION RATE: 18 BRPM | SYSTOLIC BLOOD PRESSURE: 110 MMHG | OXYGEN SATURATION: 96 % | WEIGHT: 209.88 LBS | HEIGHT: 71 IN | HEART RATE: 58 BPM | TEMPERATURE: 98 F | BODY MASS INDEX: 29.38 KG/M2

## 2021-07-29 LAB
HCT VFR BLD AUTO: 39 %
HGB BLD-MCNC: 14 G/DL

## 2021-07-29 PROCEDURE — 97530 THERAPEUTIC ACTIVITIES: CPT

## 2021-07-29 PROCEDURE — 97116 GAIT TRAINING THERAPY: CPT

## 2021-07-29 PROCEDURE — 97161 PT EVAL LOW COMPLEX 20 MIN: CPT

## 2021-07-29 PROCEDURE — 97165 OT EVAL LOW COMPLEX 30 MIN: CPT

## 2021-07-29 PROCEDURE — 85018 HEMOGLOBIN: CPT | Performed by: PHYSICIAN ASSISTANT

## 2021-07-29 PROCEDURE — 97535 SELF CARE MNGMENT TRAINING: CPT

## 2021-07-29 PROCEDURE — 85014 HEMATOCRIT: CPT | Performed by: PHYSICIAN ASSISTANT

## 2021-07-29 RX ORDER — HYDROCODONE BITARTRATE AND ACETAMINOPHEN 10; 325 MG/1; MG/1
1 TABLET ORAL EVERY 6 HOURS PRN
Status: DISCONTINUED | OUTPATIENT
Start: 2021-07-29 | End: 2021-07-29

## 2021-07-29 RX ORDER — HYDROCODONE BITARTRATE AND ACETAMINOPHEN 10; 325 MG/1; MG/1
2 TABLET ORAL EVERY 6 HOURS PRN
Status: DISCONTINUED | OUTPATIENT
Start: 2021-07-29 | End: 2021-07-29

## 2021-07-29 NOTE — PHYSICAL THERAPY NOTE
PHYSICAL THERAPY QUICK EVALUATION - INPATIENT    Room Number: 355/355-A  Evaluation Date: 7/29/2021  Presenting Problem: s/p L3-L5 TLIF, L5-S1 decompression  Physician Order: PT Eval and Treat    Problem List  Active Problems:    Spinal stenosis      Pas Sitting down on and standing up from a chair with arms (e.g., wheelchair, bedside commode, etc.): A Little   -   Moving from lying on back to sitting on the side of the bed?: A Little   How much help from another person does the patient currently need. .. set    ASSESSMENT   Patient is a 66year old male admitted on 7/28/2021 for elective L3-L5 TLIF, L5-S1 decompression . Pertinent comorbidities and personal factors impacting therapy include HTN and HL.   Functional outcome measures completed include The AM

## 2021-07-29 NOTE — PROGRESS NOTES
[facility] Hospitalist Progress Note                                                                   Mariajose  10/20/1945    SUBJECTIVE:  No acute events. Pain ok.       OBJECTIVE:  Temp TLIF with a L5-S1 decompression  - Plan per ortho. Continue PT/OT.   Pain is currently controlled.       Acute on chronic pain  - cont oral pain meds     HTN  - cont toprol      HL  - cont statin        Prevention:  SCDs, bowel regimen       OK for home to

## 2021-07-29 NOTE — PLAN OF CARE
Plan of care discussed with patient and spouse Lyndsey Dickson at bedside. Patient eager to go home. IV's removed. Intact. Rn changed dressing, showed spouse how to do, supplies sent home with them. Patient tolerating Norco well and eating well.  Voiding freely post

## 2021-07-29 NOTE — OCCUPATIONAL THERAPY NOTE
OCCUPATIONAL THERAPY QUICK EVALUATION - INPATIENT    Room Number: 355/355-A  Evaluation Date: 7/29/2021     Type of Evaluation: Quick Eval  Presenting Problem: s/p L3-5 TLIF 7/28/21    Physician Order: IP Consult to Occupational Therapy  Reason for Therapy COGNITION  WFL    RANGE OF MOTION AND STRENGTH ASSESSMENT  Upper extremity ROM is within functional limits     Upper extremity strength is within functional limits     NEUROLOGICAL FINDINGS  Neurological Findings: None                ACTIVITY TOLERANCE toileting and toilet transfer techniques, performed transfer with stanbdy assist to standard height toilet with no use of grab bar required (reports has nothing in same location at home, aware of option of safety frame if needed), simulated toileting via S admission. Patient was able to achieve the following goals:  Patient able to toilet transfer: At supervision level or above; patient reports will have supervision at home  Patient able to dress lower extremities:  At supervision level or above; patient r

## 2021-07-29 NOTE — PLAN OF CARE
A&O x4. VSS. Room air, . Mild low back pain controlled with oxy PRN. Chairback brace when OOB. Ambulating in halls tonight w/SBA, tolerated well. Molina cath in place, draining without difficulty. Bilateral Teds and SCDs.  Microfoam tape dressing C/D/I, g

## 2021-07-29 NOTE — PROGRESS NOTES
Wife at bedside. Reviewed indications, side effects of pain medication/narcotics and constipation prevention.  Stressed importance of increased fluids/roughage in diet, continued use stool softeners along with laxatives and suppositories as needed while brook

## 2021-07-30 NOTE — OPERATIVE REPORT
[facility]    PATIENT'S NAME: Donte Jeremiah   ATTENDING PHYSICIAN: Villa Ramirez M.D. OPERATING PHYSICIAN: Villa Ramirez M.D.    PATIENT ACCOUNT#:   [de-identified]    LOCATION:  [address] [place] [address]  MEDICAL RECORD #:   [id number]       DATE OF BIRTH: exiting nerves. The complex reconstructive nature of the procedure requires application of screws, rods, and interbody devices.   Any assistance, including, but not limited to, retraction, suction, and other means of facilitation of the procedure requires needle was removed with a twisting motion. All I-PAS needles were found to be well above acceptable thresholds. Styluses were removed. K-wires were applied and advanced. Inner styluses were removed as well.   Both sides had dilators applied and had appro and defined for a standard posterior interbody fusion. Significant spinal stenosis was identified consistent with the patient's radiculopathy and deficits.   We then were required to perform a technically demanding decompression using fine microcurrettes transverse process and farther anteriorly. Psoas muscle tissues were also dissected free in the far lateral plane. Significant lateral compressive disease was identified consistent with the patient's radiculopathy and deficits.   We then were required Cruz ball in place over the dura and a 4 and 5 mm Kerrison. Significant spinal stenosis was identified consistent with the patient's radiculopathy and deficits.   We then were required to perform a technically demanding decompression using fine micro transverse process. Far lateral structures were also dissected free, including psoas muscle tissues.   Curettes, jean marie, and pituitary rongeurs were used to remove the rest of the remaining disc in order to perform lateral extraforaminal and transpedicula subcuticular stitch was applied. Steri-Strips were applied. Sterile dressings were applied. The patient was extubated and taken to the recovery room in stable condition and was neurologically intact on arrival and had improvement of leg pain.   SSEPs r

## 2021-11-10 ENCOUNTER — APPOINTMENT (OUTPATIENT)
Dept: CARDIOLOGY | Age: 76
End: 2021-11-10

## 2021-11-18 ENCOUNTER — TELEPHONE (OUTPATIENT)
Dept: CARDIOLOGY | Age: 76
End: 2021-11-18

## 2022-11-08 ENCOUNTER — HOSPITAL ENCOUNTER (OUTPATIENT)
Dept: CT IMAGING | Facility: HOSPITAL | Age: 77
Discharge: HOME | End: 2022-11-08
Attending: FAMILY MEDICINE
Payer: MEDICARE

## 2022-11-08 DIAGNOSIS — R55 SYNCOPE AND COLLAPSE: ICD-10-CM

## 2022-11-08 PROCEDURE — 70450 CT HEAD/BRAIN W/O DYE: CPT | Performed by: FAMILY MEDICINE

## 2023-06-19 ENCOUNTER — HOSPITAL ENCOUNTER (OUTPATIENT)
Dept: CT IMAGING | Age: 78
Discharge: HOME | End: 2023-06-19
Attending: FAMILY MEDICINE
Payer: MEDICARE

## 2023-06-19 DIAGNOSIS — J43.9 EMPHYSEMA, UNSPECIFIED (HCC): ICD-10-CM

## 2023-06-19 DIAGNOSIS — J84.10 PULMONARY FIBROSIS (HCC): ICD-10-CM

## 2023-06-19 DIAGNOSIS — Z87.891 FORMER SMOKER: ICD-10-CM

## 2023-06-19 PROCEDURE — 71271 CT THORAX LUNG CANCER SCR C-: CPT | Performed by: FAMILY MEDICINE

## 2023-06-28 ENCOUNTER — OFFICE VISIT (OUTPATIENT)
Facility: CLINIC | Age: 78
End: 2023-06-28
Payer: MEDICARE

## 2023-06-28 VITALS
DIASTOLIC BLOOD PRESSURE: 60 MMHG | SYSTOLIC BLOOD PRESSURE: 124 MMHG | BODY MASS INDEX: 28.7 KG/M2 | WEIGHT: 205 LBS | HEIGHT: 71 IN | HEART RATE: 74 BPM | RESPIRATION RATE: 16 BRPM | OXYGEN SATURATION: 95 %

## 2023-06-28 DIAGNOSIS — J84.9 ILD (INTERSTITIAL LUNG DISEASE) (HCC): Primary | ICD-10-CM

## 2023-06-28 DIAGNOSIS — J43.9 PULMONARY EMPHYSEMA, UNSPECIFIED EMPHYSEMA TYPE (HCC): ICD-10-CM

## 2023-06-28 DIAGNOSIS — Z72.0 TOBACCO ABUSE: ICD-10-CM

## 2023-06-28 DIAGNOSIS — R55 SYNCOPE, UNSPECIFIED SYNCOPE TYPE: ICD-10-CM

## 2023-06-28 PROBLEM — J44.9 CHRONIC OBSTRUCTIVE PULMONARY DISEASE, UNSPECIFIED (HCC): Status: ACTIVE | Noted: 2023-06-28

## 2023-06-28 PROCEDURE — 3074F SYST BP LT 130 MM HG: CPT | Performed by: INTERNAL MEDICINE

## 2023-06-28 PROCEDURE — 1160F RVW MEDS BY RX/DR IN RCRD: CPT | Performed by: INTERNAL MEDICINE

## 2023-06-28 PROCEDURE — 1159F MED LIST DOCD IN RCRD: CPT | Performed by: INTERNAL MEDICINE

## 2023-06-28 PROCEDURE — 3008F BODY MASS INDEX DOCD: CPT | Performed by: INTERNAL MEDICINE

## 2023-06-28 PROCEDURE — 3078F DIAST BP <80 MM HG: CPT | Performed by: INTERNAL MEDICINE

## 2023-06-28 PROCEDURE — 99204 OFFICE O/P NEW MOD 45 MIN: CPT | Performed by: INTERNAL MEDICINE

## 2023-06-28 RX ORDER — ALBUTEROL SULFATE 90 UG/1
2 AEROSOL, METERED RESPIRATORY (INHALATION) EVERY 6 HOURS PRN
Qty: 1 EACH | Refills: 0 | Status: SHIPPED | OUTPATIENT
Start: 2023-06-28

## 2023-06-28 RX ORDER — TIOTROPIUM BROMIDE INHALATION SPRAY 3.12 UG/1
2 SPRAY, METERED RESPIRATORY (INHALATION) DAILY
Qty: 1 EACH | Refills: 1 | Status: SHIPPED | OUTPATIENT
Start: 2023-06-28

## 2023-06-28 RX ORDER — CHOLECALCIFEROL (VITAMIN D3) 50 MCG
1 TABLET ORAL AS DIRECTED
COMMUNITY

## 2023-07-01 ENCOUNTER — HOSPITAL ENCOUNTER (OUTPATIENT)
Dept: LAB | Facility: HOSPITAL | Age: 78
Discharge: HOME | End: 2023-07-01
Attending: INTERNAL MEDICINE
Payer: MEDICARE

## 2023-07-01 ENCOUNTER — RT VISIT (OUTPATIENT)
Dept: RESPIRATORY THERAPY | Facility: HOSPITAL | Age: 78
End: 2023-07-01
Attending: INTERNAL MEDICINE
Payer: MEDICARE

## 2023-07-01 DIAGNOSIS — J84.9 ILD (INTERSTITIAL LUNG DISEASE) (HCC): ICD-10-CM

## 2023-07-01 LAB — RHEUMATOID FACT SERPL-ACNC: 24 IU/ML (ref ?–15)

## 2023-07-01 PROCEDURE — 94060 EVALUATION OF WHEEZING: CPT

## 2023-07-01 PROCEDURE — 94726 PLETHYSMOGRAPHY LUNG VOLUMES: CPT

## 2023-07-01 PROCEDURE — 86431 RHEUMATOID FACTOR QUANT: CPT

## 2023-07-01 PROCEDURE — 36415 COLL VENOUS BLD VENIPUNCTURE: CPT

## 2023-07-01 PROCEDURE — 94729 DIFFUSING CAPACITY: CPT

## 2023-07-01 PROCEDURE — 86225 DNA ANTIBODY NATIVE: CPT

## 2023-07-01 PROCEDURE — 86038 ANTINUCLEAR ANTIBODIES: CPT

## 2023-07-03 DIAGNOSIS — J84.9 ILD (INTERSTITIAL LUNG DISEASE) (HCC): ICD-10-CM

## 2023-07-03 DIAGNOSIS — J43.9 PULMONARY EMPHYSEMA, UNSPECIFIED EMPHYSEMA TYPE (HCC): Primary | ICD-10-CM

## 2023-07-03 LAB
DSDNA IGG SERPL IA-ACNC: 1.5 IU/ML
ENA AB SER QL IA: 0.2 UG/L
ENA AB SER QL IA: NEGATIVE

## 2023-07-03 RX ORDER — UMECLIDINIUM 62.5 UG/1
1 AEROSOL, POWDER ORAL DAILY
Qty: 30 EACH | Refills: 0 | Status: SHIPPED | OUTPATIENT
Start: 2023-07-03 | End: 2023-08-02

## 2023-07-05 DIAGNOSIS — J43.9 PULMONARY EMPHYSEMA, UNSPECIFIED EMPHYSEMA TYPE (HCC): Primary | ICD-10-CM

## 2023-07-05 DIAGNOSIS — J84.9 ILD (INTERSTITIAL LUNG DISEASE) (HCC): ICD-10-CM

## 2023-07-05 RX ORDER — UMECLIDINIUM 62.5 UG/1
1 AEROSOL, POWDER ORAL DAILY
Qty: 30 EACH | Refills: 3 | Status: SHIPPED | OUTPATIENT
Start: 2023-07-05 | End: 2024-01-01

## 2023-07-05 NOTE — TELEPHONE ENCOUNTER
Received fax for \"need for clarification\" for spiriva RX. In notes of fax, it states insurance prefers Incruse 62.5 MCG. Pending order and routing to provider to review.

## 2023-07-13 NOTE — PROGRESS NOTES
OXYGEN CLINIC ASSESSMENT    Date: 2023 Physician: Dr. Red Huynh   Diagnosis: dyspnea Technician: Horace Porras., RT     Patient: Cristo Xie MRN: [id number] : 10/20/1945     Height: 5' 11\" (1.803 m) Weight: 205 lb Age: 76year old         BP HR SpO2 RR HYUN DIST  (FT) TIME Reason Stopped   RA         REST 120/60   52   96     16     0     N/A         N/A         N/A           RA       PEAK  EXERCISE 144/64 86 92   20   1   1100   6 MIN   Study Ended                FINDINGS  0 LPM required to maintain a saturation of 96 % at rest   0 LPM required to maintain a saturation of 92 % with exertion     LPM Pulse dose to maintain a saturation of   % with exertion   Patient does not need supplemental oxygen at rest or for exertion.

## 2023-07-13 NOTE — PROGRESS NOTES
Lizandro Acosta is a 76year old male. Patient presents with:  Throat Problem    HPI:   He has a history of neck mass. Is been present for 3 years. There is no pain associated there is no sore throat hemoptysis or dysphagia. Current Outpatient Medications   Medication Sig Dispense Refill    umeclidinium bromide (INCRUSE ELLIPTA) 62.5 MCG/ACT Inhalation Aerosol Powder, Breath Activated Inhale 1 puff into the lungs daily. 30 each 3    umeclidinium bromide (INCRUSE ELLIPTA) 62.5 MCG/ACT Inhalation Aerosol Powder, Breath Activated Inhale 1 puff into the lungs daily. 30 each 0    Cholecalciferol (VITAMIN D) 50 MCG (2000 UT) Oral Tab Take 1 tablet by mouth As Directed. Tiotropium Bromide Monohydrate (SPIRIVA RESPIMAT) 2.5 MCG/ACT Inhalation Aero Soln Inhale 2 puffs into the lungs daily. 1 each 1    albuterol 108 (90 Base) MCG/ACT Inhalation Aero Soln Inhale 2 puffs into the lungs every 6 (six) hours as needed for Wheezing or Shortness of Breath. 1 each 0    omega-3 fatty acids 1000 MG Oral Cap Take 1,000 mg by mouth daily. Multiple Vitamins-Minerals (MULTI-VITAMIN/MINERALS) Oral Tab Take 1 tablet by mouth daily. niacin 100 MG Oral Tab Take 1 tablet (100 mg total) by mouth nightly. Vitamin B-12 1000 MCG Oral Tab Take 1 tablet (1,000 mcg total) by mouth daily. Glucosamine-Chondroitin (OSTEO BI-FLEX REGULAR STRENGTH) 250-200 MG Oral Tab Take 1 tablet by mouth daily. simvastatin 40 MG Oral Tab Take 1 tablet (40 mg total) by mouth nightly. Metoprolol Succinate ER 25 MG Oral Tablet 24 Hr Take 1 tablet (25 mg total) by mouth daily.         Past Medical History:   Diagnosis Date    Back problem     Essential hypertension     Hearing impairment     bilateral hearing aids    Hearing loss     High blood pressure     High cholesterol     Hyperlipidemia     Pain in joints     Visual impairment     reading glasses      Social History:  Social History     Socioeconomic History    Marital status:    Tobacco Use    Smoking status: Former     Packs/day: 1.00     Years: 30.00     Pack years: 30.00     Types: Cigarettes    Smokeless tobacco: Never    Tobacco comments:     haven't smoked since age 46   Vaping Use    Vaping Use: Never used   Substance and Sexual Activity    Alcohol use: Yes     Comment: rare    Drug use: No      Past Surgical History:   Procedure Laterality Date    COLONOSCOPY,DIAGNOSTIC  10/26/2004    Benign Bxs at 10.0 cm and 15.0 cm. -- hyperplastic polyp         REVIEW OF SYSTEMS:   GENERAL HEALTH: feels well otherwise  GENERAL : denies fever, chills, sweats, weight loss, weight gain  SKIN: denies any unusual skin lesions or rashes  RESPIRATORY: denies shortness of breath with exertion  NEURO: denies headaches    EXAM:   There were no vitals taken for this visit. System Findings Details   Constitutional  Overall appearance - Normal.   Psychiatric  Orientation - Oriented to time, place, person & situation. Appropriate mood and affect. Head/Face  Facial features -- Normal. Skull - Normal.   Eyes  Pupils equal ,round ,react to light and accomidate   Ears, Nose, Throat, Neck  Ears clear nose clear oral cavity clear oropharynx clear neck reveals a mass at level 1 on the right-hand side. It is soft. Neurological  Memory - Normal. Cranial nerves - Cranial nerves II through XII grossly intact. Lymph Detail  Submental. Submandibular. Anterior cervical. Posterior cervical. Supraclavicular. ASSESSMENT AND PLAN:   1. Neck mass  At level 1 on the right-hand side. This could be a lipoma. The also the possibility is that it is a tumor emanating from the right parotid gland. He will undergo a CT scan of the neck. We have discussed surgery in detail. I will discuss it more after results of CT are available. - CT SOFT TISSUE OF NECK(CONTRAST ONLY) (CPT=70491); Future      The patient indicates understanding of these issues and agrees to the plan. No follow-ups on file.     Alejandro Almaraz Franko Peralta MD  7/13/2023  11:34 AM

## 2023-07-25 NOTE — TELEPHONE ENCOUNTER
I spoke with patient. CT scan reveals a 42 mm mass consistent with lipoma. He also has a few thyroid nodules. I will order thyroid ultrasound. He will also be talking with our surgery schedulers about scheduling surgery for removal of this large lipoma.

## 2023-07-28 NOTE — PROGRESS NOTES
05/16/2023 was the patient last fainting spell. Patient woke up this morning and is having difficulty with walking. Patient states last night before bed he had a sharp pain in his lower back. Denies falling last night or this morning.

## 2023-07-29 NOTE — DISCHARGE INSTRUCTIONS
.  Particulate use approximately 30 to 60 minutes before bedtime. Stay well-hydrated. Gentle stretches.

## 2023-07-29 NOTE — ED INITIAL ASSESSMENT (HPI)
Lower back pain since Thursday . No history of  trauma .  Denies urinary problem history back surgery 2  yrs ago

## 2023-08-02 NOTE — TELEPHONE ENCOUNTER
Pt called at Md request in regard to recent ER visit. Pt is supposed to have surgery tomorrow and MD was concerned about overall health due to ER visit. Pt is 2 years post spinal fusion and was experiencing a flair up. Pt did not take any contradicted medications prior to surgery while in the emergency room. No concerns in regards to pending surgery at this time.

## 2023-08-02 NOTE — PATIENT INSTRUCTIONS
Consider starting the spiriva inhaler once a day  Consider using albuterol inhaler 2 puffs every 6 hours as needed for your breathing  Obtain a repeat breathing test and CT scan in November 2023  See me after the tests  See the cardiologist about the syncope  See a rheumatologist about the positive rheumatoid factor test to check for autoimmune causes.  Call [phone] to set up an appointment with rheumatologist - we work with Dr. Primitivo Persaud and Dr. Karol Velasquez and partners

## 2023-08-03 NOTE — DISCHARGE INSTRUCTIONS
You can leave the dressing on all week. It is waterproof and you can shower. If the dressing is bothering you you can take it off after 2 days and still shower. Leave the Steri-Strips on the skin in place.

## 2023-08-03 NOTE — OPERATIVE REPORT
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 A shoulder roll was placed. 1% lidocaine with epinephrine was injected at the neck. There is then prepped and draped in usual sterile fashion. A #15 blade was used to make an incision at the neck. Was carried down through the platysma muscle. Subplatysmal flaps were raised both superiorly and inferiorly. A self-retaining retractor was placed. Patient noted to have a large lipoma. I carefully dissected around this lipoma using blunt dissection. Larger vessels were clamped and tied off with silk ties. Bovie electrocautery was also utilized. In this fashion I was able to remove the lipoma. Lipoma did extend up to the parotid gland and care was taken to stay free of the parotid gland. In this fashion the lipoma was removed in total.  The wound was irrigated out with saline. There is no further signs of bleeding. The deep layers were closed with Vicryl suture. The skin was closed with a running subcuticular 4-0 Prolene suture along with Steri-Strips and sterile dressing. Patient was awoken extubated shows recovery room in stable condition.     Jorje King MD  8/3/2023  11:54 AM

## 2023-08-03 NOTE — ANESTHESIA PROCEDURE NOTES
Airway  Date/Time: 8/3/2023 11:06 AM  Urgency: elective      General Information and Staff    Patient location during procedure: OR  Anesthesiologist: Cleopatra Washington MD  Performed: anesthesiologist   Performed by: Cleopatra Washington MD  Authorized by: Cleopatra Washington MD      Indications and Patient Condition  Indications for airway management: anesthesia  Sedation level: deep  Preoxygenated: yes  Patient position: sniffing  Mask difficulty assessment: 0 - not attempted    Final Airway Details  Final airway type: supraglottic airway      Successful airway: classic  Size 4       Number of attempts at approach: 1    Additional Comments  Easy LMA placement. No evidence of facial, dental, or oral trauma.     Arturo Bond MD  [facility]

## 2023-08-03 NOTE — INTERVAL H&P NOTE
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 I discussed with the patient and/or legal representative the potential benefits, risks and side effects of this procedure; the likelihood of the patient achieving goals; and potential problems that might occur during recuperation. I discussed reasonable alternatives to the procedure, including risks, benefits and side effects related to the alternatives and risks related to not receiving this procedure. We will proceed with procedure as planned.

## 2023-08-10 NOTE — PROGRESS NOTES
He is postop excision of deep lipoma right neck doing well having no pain. Examination reveals the incision site to be healing well without infection. The sutures removed. Stable postop excision of right neck deep lipoma. He also has a thyroid nodule and will be undergoing a thyroid ultrasound later than calling me.

## 2023-10-10 NOTE — ED QUICK NOTES
Orders for admission, patient is aware of plan and ready to go upstairs. Any questions, please call ED RN Sierra Irizarry at extension [phone].      Patient Covid vaccination status: Unvaccinated     COVID Test Ordered in ED: None    COVID Suspicion at Admission: N/A    Running Infusions:  None    Mental Status/LOC at time of transport: A&O x 4    Other pertinent information:   CIWA score: N/A   NIH score:  0

## 2023-10-10 NOTE — PROGRESS NOTES
Admission ortho's negative. NO dizzyness/lightheadedness at this time.        10/10/23 1006 10/10/23 1007 10/10/23 1008   Vital Signs   Pulse 56 57 61   Heart Rate Source Monitor  --   --    Resp 18  --   --    Respiratory Quality Normal  --   --    /69  --  134/74   MAP (mmHg) 90  --  93   BP Location Right arm  --   --    BP Method Automatic  --   --    Patient Position Lying  --  Sitting      10/10/23 1009   Vital Signs   Pulse 82   Heart Rate Source  --    Resp  --    Respiratory Quality  --    /69   MAP (mmHg) 84   BP Location  --    BP Method  --    Patient Position Standing

## 2023-10-10 NOTE — PLAN OF CARE
TTE 8/10/23  [facility]      1. The study quality is good. 2. The left ventricle is normal in size, wall thickness, and global left ventricular systolic function. The left ventricular ejection fraction is 55%. Left ventricular diastolic function is indeterminate. No regional wall motion abnormalities. 3. The left atrium is mildly dilated measuring 4.1 cms. 4. Mild calcification of the aortic valve is noted with adequate cuspal excursion. 5. The right ventricle is normal in size. Right ventricular systolic function is normal.  6. The estimated pulmonary artery systolic pressure is 24 mmHg assuming a right atrial pressure of 3 mmHg.

## 2023-10-10 NOTE — ED INITIAL ASSESSMENT (HPI)
Pt arrived via EMS post fall/syncope. He is A/Ox3, states he got up to use the toilet when felt a little dizzy, passed out and fell forward. States he woke up in the floor, \"didn't pass out for a long time, woke up right away when I hit the floor. \" Pt states he crawled back to bed as he had difficulty getting up. Reports numerous episodes of syncope in the past.     Pt denies CP, he denies ELAINA. He denies n/v, denies visual changes, denies dizziness at this time. Pt is on Eliquis.  Pt denies pain elsewhere

## 2023-10-10 NOTE — PLAN OF CARE
NURSING ADMISSION NOTE      Patient admitted via Cart  Oriented to room. Safety precautions initiated. Bed in low position. Call light in reach. Skin check preformed with Hyacinth GRIJALVA. Skin CDI except for scratches on left buttock. Pt A&Ox4. Lungs dim on RA. SB/NSR with 1st degree AVB on tele. Pt denies dizzyness/lightheadedness at this time. Pt voids in urinal. Family at bedside. Bed alarm on for safety. Call light and belongings within reach. Problem: CARDIOVASCULAR - ADULT  Goal: Maintains optimal cardiac output and hemodynamic stability  Description: INTERVENTIONS:  - Monitor vital signs, rhythm, and trends  - Monitor for bleeding, hypotension and signs of decreased cardiac output  - Evaluate effectiveness of vasoactive medications to optimize hemodynamic stability  - Monitor arterial and/or venous puncture sites for bleeding and/or hematoma  - Assess quality of pulses, skin color and temperature  - Assess for signs of decreased coronary artery perfusion - ex.  Angina  - Evaluate fluid balance, assess for edema, trend weights  Outcome: Progressing  Goal: Absence of cardiac arrhythmias or at baseline  Description: INTERVENTIONS:  - Continuous cardiac monitoring, monitor vital signs, obtain 12 lead EKG if indicated  - Evaluate effectiveness of antiarrhythmic and heart rate control medications as ordered  - Initiate emergency measures for life threatening arrhythmias  - Monitor electrolytes and administer replacement therapy as ordered  Outcome: Progressing

## 2023-10-10 NOTE — PROGRESS NOTES
10/10/23 1559   Clinical Encounter Type   Visited With Patient and family together   Routine Visit Introduction   Continue Visiting No   Referral From Nurse   Referral To [facility] Accepting   Family Participation in [place] [address] During Treatment Consistently   Taxonomy   Intended Effects Aligning care plan with patient's values   Methods Collaborate with care team member   Interventions Assist someone with Advance Directives     Created a new POA for Healthcare document that, per the patient, accurately reflects their wishes. Gave the patient the original POA document along with copies. Confirmed that the PoA primary agent name and contact information have been entered into the Epic, Advance Directives section. A copy of the new POA document has been placed on the patient's paper chart. Spiritual care can be requested via an Epic consult.   Glennis Carrel

## 2023-10-11 NOTE — TREATMENT PLAN
History and Physical - Consult reviewed. Dx repeat Syncope. No changes in patient's condition. Dx recurrent syncope. Discussion of Loop recorder implant. Patient agrees to procedure. Consent signed. Will proceed wit Loop recorder iplant as per Dr. Pedro Wilks order.    Diogenes GARCIA

## 2023-10-11 NOTE — PLAN OF CARE
Patient had Linq implant today with JOSE Murry. Patient tolerated procedure well. Left upper chest mepilex dressing in place, CDI. Mikal Vázquez rep @ bedside and instructed patient on monitor. Report called to Maryfrances Phalen, RN. Patient transported back to room 2626 by bed.

## 2023-10-11 NOTE — PLAN OF CARE
Shift Note:  Assumed care of patient. Very pleasant, alert and oriented x4, dentures/glasses/hearing aids at the bedside. Patient on room air, denies difficulty breathing, lung sounds clear. Denies any cardiac symptoms, NSR on tele, orthostatic (-) on admission, denies any dizziness. Denies pain at this time. Continent of bowel and bladder, last BM 10/9. Ambulates independently, call light within reach, tolerating care well.     1630:  Patient return from [facility] monitor insertion. Vitals stable. Dressing clean, dry and intact. Denies any pain at this time.      POC:  - Ep to eval need to PPM versus loop recorder  - Hold AC/ACE

## 2023-10-11 NOTE — PROCEDURES
Procedure- LINQ device implant. -T^. Derik Husain, Stephon Laura MD    Indication- Recurrent Syncope. Complication- none    Methods- The patient was prepped and draped in a sterile manner. Local anesthesia was infiltrated into the 4th parasternal intercostal space. A small puncture incision was made. The device tunnel tool was inserted to make a subcutaneous pocket. The device was deployed into the subcutaneous tissue. Steri-strips were applied along with a sterile dressing. Conclusions:  1. Status post successful LINQ device implant. # [id number]  2. Follow-up in the [facility] device clinic in 1-2 weeks.      Lena Bagley, APRN  10/11/2023

## 2023-10-11 NOTE — PLAN OF CARE
Received patient at 0730. Alert and oriented x4. Tele rhythm NSR O2 saturation 95% on room air Breath sounds clear. Bed is locked and in low position. Call light and personal items within reach. No C/O chest pain or shortness of breath. Pt voiding with no issue. Skin dry/Intact. Reviewed plan of care and patient verbalizes understanding. POC: Orthos qshift, hold eliquis, EP to see, loop recorder vs pacemaker. Problem: CARDIOVASCULAR - ADULT  Goal: Maintains optimal cardiac output and hemodynamic stability  Description: INTERVENTIONS:  - Monitor vital signs, rhythm, and trends  - Monitor for bleeding, hypotension and signs of decreased cardiac output  - Evaluate effectiveness of vasoactive medications to optimize hemodynamic stability  - Monitor arterial and/or venous puncture sites for bleeding and/or hematoma  - Assess quality of pulses, skin color and temperature  - Assess for signs of decreased coronary artery perfusion - ex.  Angina  - Evaluate fluid balance, assess for edema, trend weights  Outcome: Progressing  Goal: Absence of cardiac arrhythmias or at baseline  Description: INTERVENTIONS:  - Continuous cardiac monitoring, monitor vital signs, obtain 12 lead EKG if indicated  - Evaluate effectiveness of antiarrhythmic and heart rate control medications as ordered  - Initiate emergency measures for life threatening arrhythmias  - Monitor electrolytes and administer replacement therapy as ordered  Outcome: Progressing

## 2023-10-11 NOTE — PROGRESS NOTES
Discharge Note:     Patient tele discontinued, IV discontinued with catheter intact. Patient discharge instructions reviewed with patient and spouse, verbalize understanding, answered all questions. Medtronic monitor taken by patient, instructed to leave dressing in place for 5 days. Patient escorted via wheelchair to the [facility] by [facility].

## 2023-10-15 NOTE — DISCHARGE SUMMARY
[facility] HOSPITALIST  DISCHARGE SUMMARY     Adela Villela Patient Status:  Inpatient    10/20/1945 MRN [id number]   [place] 2NE-A Attending No att. providers found   [facility] # 1 PCP Kusum Holley MD     Date of Admission: 10/10/2023  Date of Discharge:  10/11/2023     Discharge Disposition: Home or Self Care    Discharge Diagnosis:  #Syncope  Cards on CS plan for LINQ insertion  Neg orthostatic VS  #HLD  Continue statin  #Paroxysmal afib  Eliquis on hold due to reccurent falls/ syncope - ? [place]   #H/o pulmonary fibrosis, COPD  #spinal stenosis     History of Present Illness:   Adela Villela is a 76year old male with history of recurrent syncope, afib on eliquis, COPD, pulmonary fibrosis, HTN, HLD, spinal stenosis with recent recurrent syncope presented with another syncope and fall. Patient had recurrent episodes of syncope since 2022. He has been evaluated by cardiology and neurology. Carotid US in 2022 was negative. TTE was without acute findings. He had a 14-day MCT which showed 3 hours of atrial fibrillation that was rate controlled. He also had 14 brief SVT and frequent PVCs. TTE on 8/10/23 with EF 55%, no RWMA. He was started on eliquis. Patient reports episodes have been as frequent as 1-2 times per month. [place] These episodes occurred when he is in the standing position and he feels lightheaded and falls forward but denies LOC. He feels \"hazy\" afterward but usually able to continue with the game. Family witnessed an episode on 22 where he carried a heavy suitcase upstairs and was on his way back down when he was winded and he started to immediately fall forward and had brief LOC. Patient denies any prodromal symptoms during this episode. He was a bit confused with brief left sided shaking that lasted a few seconds, but he appeared sick/pale for an hour after the episode and he was back to his normal self.  Last night, he was sleeping and got up to use the bathroom. He emptied his bladder while seated on the commode when he felt lightheaded and then fell forward and thinks he had brief LOC. He denies head trauma. He felt dizzy still, but was able to crawl himself back to the bed. He denies palpitations, nausea, clammy feeling, chest pain associated with dizziness. He denies fever, chills, changes in weight. ED VSS on RA. EKG with SR and old 1st degree AV block. CBC, CMP and HS troponin x 2 unremarkable. CXR and CT brain without acute findings. Cardiology is consulted and patient is admitted. Brief Synopsis:   Pt was admitted, cards consulted for evaluation of syncope. LINQ device was implanted on 10/11/23 and he was DC in stable condition    Lace+ Score: 70  59-90 High Risk  29-58 Medium Risk  0-28   Low Risk       TCM Follow-Up Recommendation:  LACE > 58: High Risk of readmission after discharge from the hospital.  **Certification    Admission date was 10/10/2023. Inpatient stay was shorter than expected. Patient's Syncope and collapse was initially serious enough to expect a more lengthy hospitalization but patient improved faster than expected. Procedures during hospitalization:   As above    Incidental or significant findings and recommendations (brief descriptions):  no    Lab/Test results pending at Discharge:   no    Consultants:  Cards    Discharge Medication List:     Discharge Medications        CONTINUE taking these medications        Instructions Prescription details   cyanocobalamin 1000 MCG Tabs  Commonly known as: Vitamin B12      Take 1 tablet (1,000 mcg total) by mouth daily. Refills: 0     Multi-Vitamin/Minerals Tabs      Take 1 tablet by mouth daily. Refills: 0     niacin 100 MG Tabs      Take 1 tablet (100 mg total) by mouth nightly. Refills: 0     omega-3 fatty acids 1000 MG Caps  Commonly known as: Fish Oil      Take 1,000 mg by mouth daily.    Refills: 0     Osteo Bi-Flex Regular Strength 250-200 MG Tabs  Generic drug: Glucosamine-Chondroitin      Take 1 tablet by mouth daily. Refills: 0     simvastatin 40 MG Tabs  Commonly known as: Zocor      Take 1 tablet (40 mg total) by mouth nightly. Refills: 0     Vitamin D 50 MCG ( [place]) Tabs      Take 1 tablet by mouth As Directed. Refills: 0            STOP taking these medications      Eliquis 5 MG Tabs  Generic drug: apixaban  Notes to patient: NO more blood thinner per Dr Sachin Mane        lisinopril 2.5 MG Tabs  Commonly known as: Prinivil; Zestril  Notes to patient: HOLD until instructed to resume. [place]    Follow-up appointment:   Nuvia Yap MD  [address] [place] [address]  [id number]  [phone]    Follow up  Office will call you for follow up appt [facility] Dr. Colt Stearns in 2-4 weeks. [phone]. Appointments for Next 30 Days 10/15/2023 - 2023        Date Arrival Time Visit Type Length Department Provider     2023  2:30 PM  EXAM - ESTABLISHED [2844] 30 min Jessica Ackerman MD    Patient Instructions:         Location Instructions:     Masks are optional for all patients and visitors, unless otherwise indicated. Vital signs:       Physical Exam:    General: No acute distress   Lungs: clear to auscultation  Cardiovascular: S1, S2  Abdomen: Soft      -----------------------------------------------------------------------------------------------  PATIENT DISCHARGE INSTRUCTIONS: See electronic chart    Santos Sauceda MD    Total time spent on discharge plannin minutes     The [facility] makes medical notes like these available to patients in the interest of transparency. Please be advised this is a medical document. Medical documents are intended to carry relevant information, facts as evident, and the clinical opinion of the practitioner.  The medical note is intended as peer to peer communication and may appear blunt or direct. It is written in medical language and may contain abbreviations or verbiage that are unfamiliar.

## 2024-01-18 NOTE — TELEPHONE ENCOUNTER
Pt called today 1/18/24 and said he is not in [place] he will do repeat CT and PFT when he comes back to town.

## 2024-06-20 NOTE — PAT NURSING NOTE
Per PAT encounter/Rebellehart message sent to pt:    PreOp Instructions     You are scheduled for: a Cardiac Procedure     Date of Procedure: 06/25/24 Tuesday     Diet Instructions: Do not eat or drink anything after midnight     Medications: Medications you are allowed to take can be taken with a sip of water the morning of your procedure     Medications to Stop: Hold herbal supplements and vitamins morning of the procedure 6/25.     Skin Prep: Shower with antibacterial soap using a clean washcloth, prior to procedure     Arrival Time: The day prior to your procedure (Monday) you will receive a phone call before 6:00 pm with your arrival time. If you haven't received a phone call, please check your voicemail messages., If you did not receive a voice mail and it is after 6:00 pm, please call the nursing supervisor at [phone].    Driving After Procedure: If sedation is given, you WILL NOT be able to drive home. You will need a responsible adult  to drive you home., Cannot take uber or cab unless approved by physician     Discharge Teaching: Your nurse will give you specific instructions before discharge, Most people can resume normal activities in 2-3 days, Any questions, please call the physician's office      parking is available starting at 6 am or park in the [place] parking garage at [facility]. Check in at the [facility] reception desk. Our  will be there to check you in for your procedure. Please bring your insurance cards and ID with you.                                                                                                                                      Please DO NOT respond to this message, the inbasket is not monitored for messages. For any questions, please call the physician's office.

## 2024-06-25 NOTE — DISCHARGE INSTRUCTIONS
LINQ  REMOVAL DISCHARGE INSTRUCTIONS:    You will go home with a dressing over the incision site. This will need to remain in place for 5 days. You may sponge bathe until the 5th day.     On day 5, wash your hands with soap and water for 20 seconds prior to removing the dressing. On day 5, you may shower and remove dressing. Leave the steri strips in place.     Signs of infection... Redness at the puncture site, increased pain, swelling, fever or drainage. If you have any of these symptoms, call your doctor's appointment for an appointment.     You may have slight bruising. Take Tylenol for discomfort if you are not allergic.     If you have any questions, problems, or concerns, please contact your doctor's office.                 Pacemaker and Defibrillator Discharge Instructions    You will go home with a dressing over the incision site. This will need to remain in place for 5 days. You may sponge bathe until the 5th day.     On day 5, wash your hands with soap and water for 20 seconds prior to removing the dressing. On day 5, you may shower and remove dressing. Leave the steri strips in place. They will fall of on their own in 7-10 days. Make an appointment to follow up at the pacemaker clinic for a site check in 1 week.     Signs of infection... Redness at the puncture site, increased pain, swelling, fever or drainage. If you have any of these symptoms, call your doctor's appointment for an appointment.     You may have slight bruising. Take Tylenol for discomfort if you are not allergic.     If you have any questions, problems, or concerns, please contact your doctor's office.     Remember to sleep within 6 feet of your monitor.     Activity  Plan to rest tonight and tomorrow.  Avoid drinking alcohol for next 24 hours.  Do not drive after procedure until 1-week device check appointment, unless otherwise instructed by your cardiologist.   Wear sling for next 24 hours, then only at night as needed for 30 days to  help assist with arm restrictions while sleeping.     Arm Restrictions:  For 30 days after implant:  do not lift arm with implanted device above your head or behind your back. Arm is to remain below your shoulder and in front of your body.   do not lift more than 10 lbs with affected arm   do not perform repetitive cycling motion with affected arm (swimming, golfing, bowling, tennis, exercise machines, raking, vacuuming, snow shoveling).   Dr. Sung Only Patients: Do not perform repetitive cycling motion for 90 days total    Incision Care/Bathing  Keep incision site completely dry for 5 days after surgery. After day 5, you can remove top dressing and shower, letting clean soapy water run over incision area, patting dry.   The white steri-strips placed directly over the incision will gradually fall off over the next few weeks and can be physically removed after 3 weeks. Do not take them off before this time. (If you have a zipline dressing, this will be removed by device nurse or MD in 4 weeks)   Keep procedure site clean and dry, do not apply any ointments, creams, lotions to the incision.   Look at your incision daily to monitor for signs and symptoms of infection (redness, swelling, drainage, foul odor from procedure site)  Do not cover incision with extra dressings or gauze unless otherwise instructed.   Do not submerge incision in water, take whirlpool baths or swim for 30 days or until site is completely healed.     When to notify your physician:   If you have chest pain, shortness of breath or persistent cough  If you experience any signs of fever (temperature >101), chills, infection (redness, swelling, thick yellow drainage, foul order from procedure site)  New or worsening swelling of arm with implanted device   If an ICD was inserted and you receive a shock and have no symptoms, call [facility] device clinic. If you have symptoms (fainting, near fainting, dizziness, lightheadedness, chest pain, shortness of  breath, palpitations), call 911.    [facility] Device Clinic Direct Line: [phone]. Monday-Friday. 8:30AM-4PM.   [facility] [place] Main Office: [phone] Monday- Friday 8AM-5PM   [facility] [place] Main Office: [phone] Monday-Friday 8AM-5PM

## 2024-06-25 NOTE — PROGRESS NOTES
Pt post PPM insertion. Pt awake, vss. Left chest drsg x 2 are CDI.    Recovery complete per protocol. Vss. CXR WNL. Discharge instructions reviewed, iv dc'd and pt discharged home with wife driving.

## 2024-06-25 NOTE — PROCEDURES
OPERATION(S) PERFORMED:   1. Dual-chamber pacemaker implant to preserve AV synchrony and prevent pacemaker syndrome.    2. Chest fluoroscopy.   3. Left upper extremity venography.   4. LINQ removal.     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  SEDATION: IV was maintained by RN. Patient was assessed by myself and the nursing staff, IV sedation (versed and fentanyl) were administered during continuous ECG, pulse oximeter, and non-invasive hemodynamic monitoring. I was present from the time of sedation being started to the end of the procedure ([id number]).        METHODS: The patient was brought to the outpatient cardiac telemetry unit in a fasting and nonsedated state after providing informed consent. IV sedation was administered during continuous ECG, pulse oximeter, and noninvasive hemodynamic monitoring. After administering 1% lidocaine for local anesthesia, an incision was made parallel to the left deltopectoral groove. The plane of the incision was extended to the prepectoral fascia. A pocket was formed. IV contrast was injected through peripheral IV in the left arm. The axillary vein was widely patent.  Access to the axillary vein was achieved via the extrathoracic approach with two separate punctures.The guidewires were advanced to the IVC. The RV lead was placed in the RV septum to achieve conduction capture, the LVAT in V5 was 61ms, QRS duration 109ms.. Local electrograms and pacing thresholds were measured. In a similar manner, an atrial lead was positioned in the RA appendage. Local electrograms and pacing thresholds were measured. Pacing was performed at 10 v to rule out phrenic nerve stimulation. The pocket was irrigated with antibiotic solution. Bleeding was sought for until hemostasis was achieved. The leads were  connected to a pulse generator. They were coiled and placed into the pocket along with the pulse generator. The incision was closed in 2 layers using 2-0 and 3-0 Vicryl. Steri-Strips were applied followed by a sterile dressing. The LINQ device was removed from the 4th IC space. The left arm was placed in a sling and the patient was transported to telemetry in stable condition. There were no apparent intraoperative complications.       CONCLUSIONS:   1. Status post successful implant of a Medtronic dual-chamber pacemaker with a  fixation right ventricular (RV) lead, active fixation RA lead:   2. Adequate RA, RV pacing and sensing thresholds. The RV lead was placed in the RV septum to achieve conduction capture, the LVAT in V5 was 61ms, QRS duration 109ms.    Arsalan Ricardo MD  [facility] ([facility])   Cardiac Electrophysiolgy  [phone]

## 2024-07-23 NOTE — ED QUICK NOTES
Pt reevlauated by er physician. Pt and wife informed of his test reports and plan of care. Verbalizing understanding

## 2024-07-23 NOTE — ED PROVIDER NOTES
Patient Seen in: [facility] Emergency Department      History   No chief complaint on file.    Stated Complaint: Vertigo    Subjective:   HPI    Patient comes to the emergency department with acute onset of dizziness which patient describes as a room spinning sensation.  He initially noted as he awoke this morning.  It lasted for a few seconds and then resolved spontaneously.  Then when he turned in bed, he had recurrence of the vertigo.  The vertigo then resolved again after short period of time.  Patient had symptoms again when he sat up.  Again, his symptoms resolved after a very short period of time.  On arrival here in the emergency department, the patient had no symptoms.  He has no previous history of dizziness or vertigo.  Patient does have significant past history of pacemaker, COPD.  Patient also has a history of hearing loss and has chronic tinnitus as well.    Objective:   Past Medical History:    Back problem    COPD (chronic obstructive pulmonary disease) (HCC)    no o2    Essential hypertension    Hearing impairment    bilateral hearing aids    Hearing loss    High blood pressure    High cholesterol    Hyperlipidemia    Pain in joints    Visual impairment    reading glasses              Past Surgical History:   Procedure Laterality Date    Colonoscopy      Colonoscopy,diagnostic  10/26/2004    Benign Bxs at 10.0 cm and 15.0 cm. -- hyperplastic polyp    Spine surgery procedure unlisted                  Social History     Socioeconomic History    Marital status:    Tobacco Use    Smoking status: Former     Current packs/day: 0.00     Average packs/day: 1 pack/day for 30.0 years (30.0 ttl pk-yrs)     Types: Cigarettes     Start date:      Quit date:      Years since quittin.5    Smokeless tobacco: Never    Tobacco comments:     haven't smoked since age 48   Vaping Use    Vaping status: Never Used   Substance and Sexual Activity    Alcohol use: Yes     Comment: rare    Drug  use: No   Other Topics Concern    Caffeine Concern Yes     Comment: coffee occ    Exercise No     Social Determinants of Health     Food Insecurity: No Food Insecurity (10/10/2023)    Food Insecurity     Food Insecurity: Never true   Transportation Needs: No Transportation Needs (10/10/2023)    Transportation Needs     Lack of Transportation: No   Physical Activity: Unknown (11/4/2020)    [facility], [facility]    Exercise Vital Sign     Days of Exercise per Week: 0 days    [facility], [facility]    Social Connections   Housing Stability: Low Risk  (10/10/2023)    Housing Stability     Housing Instability: No   Recent Concern: Housing Stability - At Risk (8/17/2023)    [facility]              Review of Systems    Positive for stated Chief Complaint: No chief complaint on file.    Other systems are as noted in HPI.  Constitutional and vital signs reviewed.      All other systems reviewed and negative except as noted above.    Physical Exam     ED Triage Vitals [07/23/24 0728]   BP (!) 160/93   Pulse 60   Resp 17   Temp 98.6 °F (37 °C)   Temp src Temporal   SpO2 99 %   O2 Device None (Room air)       Current Vitals:   Vital Signs  BP: 147/80  Pulse: 60  Resp: 15  Temp: 98.6 °F (37 °C)  Temp src: Temporal  MAP (mmHg): 100    Oxygen Therapy  SpO2: 98 %  O2 Device: None (Room air)            Physical Exam  Vitals and nursing note reviewed.   Constitutional:       Appearance: He is well-developed.   HENT:      Head: Normocephalic.   Cardiovascular:      Rate and Rhythm: Normal rate and regular rhythm.      Heart sounds: Normal heart sounds. No murmur heard.     Comments: Patient does have a pacemaker in the left subclavian region.  Pulmonary:      Effort: Pulmonary effort is normal.      Breath sounds: Normal breath sounds.   Abdominal:      General: Bowel sounds are normal.       Palpations: Abdomen is soft.      Tenderness: There is no abdominal tenderness. There is no guarding.   Musculoskeletal:         General: No tenderness. Normal range of motion.      Cervical back: Normal range of motion and neck supple.   Lymphadenopathy:      Cervical: No cervical adenopathy.   Skin:     General: Skin is warm and dry.      Findings: No rash.   Neurological:      Mental Status: He is alert and oriented to person, place, and time.      Sensory: No sensory deficit.      Comments: Patient has no focal motor or sensory deficits.  No speech deficit is noted.  No cerebellar signs are noted.    Gokul-Hallpike maneuver was performed and patient had acute onset of his vertiginous symptoms on the right side.  This maneuver then was transition directly into an Epley maneuver which then completely resolved his symptoms.              ED Course     Labs Reviewed   COMP METABOLIC PANEL (14) - Abnormal; Notable for the following components:       Result Value    Glucose 119 (*)     Globulin  2.7 (*)     All other components within normal limits   TROPONIN I HIGH SENSITIVITY - Normal   CBC WITH DIFFERENTIAL WITH PLATELET    Narrative:     The following orders were created for panel order CBC With Differential With Platelet.  Procedure                               Abnormality         Status                     ---------                               -----------         ------                     CBC W/ DIFFERENTIAL[[id number]]                              Final result                 Please view results for these tests on the individual orders.   RAINBOW DRAW LAVENDER   RAINBOW DRAW LIGHT GREEN   RAINBOW DRAW BLUE   CBC W/ DIFFERENTIAL     EKG    Rate, intervals and axes as noted on EKG Report.  Rate: 63  Rhythm: Sinus Rhythm  Reading: Bifascicular block.  No acute change when compared to EKG performed October last year.  The patient does have a EKG performed in June of this year, but this revealed AV dual paced  complexes.  Patient has a known pacemaker.              ED Course as of 07/23/24 1908  ------------------------------------------------------------  Time: 07/23 0912  Comment: Chemistries and CBC are unremarkable.  ------------------------------------------------------------  Time: 07/23 0913  Value: Troponin I (High Sensitivity): 8  Comment: (Reviewed)  ------------------------------------------------------------  Time: 07/23 0913  Value: XR CHEST AP PORTABLE  (CPT=71045)  Comment: Images were independently viewed by me and no acute infiltrate is noted.  Cardiac and mediastinal silhouettes were normal.     I discussed the possible differential of a central source of the patient's vertigo. Given the patient's physical exam which is highly suggestive of a peripheral source, along with previous history of tinnitus. Discussed potential MRI, but felt that his symptoms and clinical presentation were more suggestive of peripheral vertigo. Discussed options with both patient and family.  We shared decision making, opted to hold off on MRI, particularly since the patient has a pacemaker.    The patient remained asymptomatic through the remainder of his emergency department stay.  He had no further recurrence of symptoms and was able to ambulate normally.  No other neurologic symptoms were noted while in the emergency department.         MDM      Patient comes into the emergency department with a history of 3 short-lived paroxysms of vertigo as he was waking up this morning.  Symptoms were precipitated by head movement.  While in the emergency department, patient had symptoms brought on with Gokul-Hallpike maneuver and symptoms were resolved with Epley maneuver.  Patient was also given meclizine afterward.  Differential diagnosis did include posterior circulation ischemia, but given the patient's rather abrupt onset of symptoms with head position changes along with resolution with Epley maneuver, I feel that patient's symptoms  are secondary to a peripheral pathology and that MRI would be of extremely low yield and also have a certain risk because of the patient's pacemaker.  I discussed this with the patient and family and they were agreeable to holding off on MRI.  Patient remained asymptomatic in the emergency department and had no further symptoms in the emergency department.                                   Medical Decision Making      Disposition and Plan     Clinical Impression:  1. Peripheral vertigo involving right ear         Disposition:  Discharge  7/23/2024  9:26 am    Follow-up:  Kai Robins MD  [address]  [address] [id number] [phone]    Follow up      Darlene Colindres MD  [address] [address]  [phone]    Follow up            Medications Prescribed:  Discharge Medication List as of 7/23/2024  9:33 AM        START taking these medications    Details   meclizine 25 MG Oral Tab Take 1 tablet (25 mg total) by mouth 3 (three) times daily as needed., Normal, Disp-20 tablet, R-0

## 2024-07-23 NOTE — ED INITIAL ASSESSMENT (HPI)
Pt states he was in bed at 0600 this morning and experience dizziness (spinning). Denies chest pain

## (undated) DEVICE — SUTURE VICRYL 1 OS-6

## (undated) DEVICE — Device: Brand: INTELLICART™

## (undated) DEVICE — NV CLIP DSC&IN-LINE ACTIVATOR

## (undated) DEVICE — C-ARM: Brand: UNBRANDED

## (undated) DEVICE — 3M™ MICROFOAM™ TAPE 1528-4: Brand: 3M™ MICROFOAM™

## (undated) DEVICE — DRAPE TABLE COVER 44X90 TC-10

## (undated) DEVICE — STERILE POLYISOPRENE POWDER-FREE SURGICAL GLOVES: Brand: PROTEXIS

## (undated) DEVICE — SUTURE VICRYL 2-0 FSL

## (undated) DEVICE — 11.1-M5 MULTIMODALITY KIT 5

## (undated) DEVICE — RELINE POWER

## (undated) DEVICE — MAXCESS MAS TLIF 2 KIT ACCESS

## (undated) DEVICE — SOL  .9 1000ML BTL

## (undated) DEVICE — WIRE FX PRCPT KRSH BVL BLNT

## (undated) DEVICE — GRAFT DELIVERY SYS MODULE

## (undated) DEVICE — KIT POSITIONING PROAXIS PRONEV

## (undated) DEVICE — NV I-PAS III DIAMOND TIP

## (undated) DEVICE — FLOSEAL HEMOSTATIC MATRIX, 5ML: Brand: FLOSEAL HEMOSTATIC MATRIX

## (undated) DEVICE — C-ARMOR C-ARM EQUIPMENT COVERS CLEAR STERILE UNIVERSAL FIT 12 PER CASE: Brand: C-ARMOR

## (undated) DEVICE — LAMINECTOMY CDS: Brand: MEDLINE INDUSTRIES, INC.

## (undated) DEVICE — 3.0MM PRECISION ROUND

## (undated) DEVICE — WRAP COOLING BACK W/NO PILLOW

## (undated) DEVICE — RELINE MAS BLADE FASCIAL SPLIT

## (undated) DEVICE — LIGHT HANDLE

## (undated) DEVICE — 450 ML BOTTLE OF 0.05% CHLORHEXIDINE GLUCONATE IN 99.95% STERILE WATER FOR IRRIGATION, USP AND APPLICATOR.: Brand: IRRISEPT ANTIMICROBIAL WOUND LAVAGE

## (undated) DEVICE — Device

## (undated) DEVICE — UNDYED BRAIDED (POLYGLACTIN 910), SYNTHETIC ABSORBABLE SUTURE: Brand: COATED VICRYL

## (undated) DEVICE — SCD SLEEVE KNEE HI BLEND

## (undated) DEVICE — NUVAMAP O.R. TECHNOLOGY

## (undated) NOTE — LETTER
[facility]  Phone: [phone]  OUTSIDE TESTING RESULT REQUEST      TO:   Dr. Darrian Miller     Today's Date: 6/24/21    FAX #: [phone]     IMPORTANT: FOR YOUR IMMEDIATE ATTENTION  Please FAX all test results listed below to:

## (undated) NOTE — LETTER
CLARIFICATION FOR E-SSS    To: Dr Feng Bingham     Patient Name: Jose Rodriguez / Sex: 10/20/1945-A: 76 y  male   Medical Records: [id number] CSN: [id number]      Procedure Description:  Excision Deep Lipoma Neck    Please note that clarification is needed on the Electronic-Surgery Scheduling Sheet (E-SSS)  the original is included with this letter. Please have physician review and make changes on the faxed copy of the E-SSS. Laterality per patient should be right    Other: [address] ON HIS RIGHT ANTERIOR NECK    ALL CHANGES MUST BE DOCUMENTED ON THE E-SSS AND SIGNED BY THE PHYSICIAN    After physician has made the changes and signed the E-SSS please fax it to [phone] and these changes will then be made in Epic by the Varun Monroe.      If you have any questions please call Pre-Admission Testing at [phone]    Thank you

## (undated) NOTE — LETTER
[facility] [address], [address]  Consent for Procedure/Sedation   Date: 10/11/2023         Time: 1400    I hereby authorize Michael GARCIA my physician and his/her assistants (if applicable), which may include medical students, residents, and/or fellows, to perform the following surgical operation/ procedure and administer such anesthesia as may be determined necessary by my physician: Linq implant  on [address]  2. I recognize that during the surgical operation/procedure, unforeseen conditions may necessitate additional or different procedures than those listed above. I, therefore, further authorize and request that the above-named surgeon, assistants, or designees perform such procedures as are, in their judgment, necessary and desirable. 3.   My surgeon/physician has discussed prior to my surgery the potential benefits, risks and side effects of this procedure; the likelihood of achieving goals; and potential problems that might occur during recuperation. They also discussed reasonable alternatives to the procedure, including risks, benefits, and side effects related to the alternatives and risks related to not receiving this procedure. I have had all my questions answered and I acknowledge that no guarantee has been made as to the result that may be obtained. 4.   Should the need arise during my operation/procedure, which includes change of level of care prior to discharge, I also consent to the administration of blood and/or blood products. Further, I understand that despite careful testing and screening of blood or blood products by collecting agencies, I may still be subject to ill effects as a result of receiving a blood transfusion and/or blood products.   The following are some, but not all, of the potential risks that can occur: fever and allergic reactions, hemolytic reactions, transmission of diseases such as Hepatitis, AIDS and Cytomegalovirus (CMV) and fluid overload. In the event that I wish to have an autologous transfusion of my own blood, or a directed donor transfusion, I will discuss this with my physician. Check only if Refusing Blood or Blood Products  I understand refusal of blood or blood products as deemed necessary by my physician may have serious consequences to my condition to include possible death. I hereby assume responsibility for my refusal and release the hospital, its personnel, and my physicians from any responsibility for the consequences of my refusal.         o  Refuse         5. I authorize the use of any specimen, organs, tissues, body parts or foreign objects that may be removed from my body during the operation/procedure for diagnosis, research or teaching purposes and their subsequent disposal by hospital authorities. I also authorize the release of specimen test results and/or written reports to my treating physician on the hospital medical staff or other referring or consulting physicians involved in my care, at the discretion of the Pathologist or my treating physician. 6.   I consent to the photographing or videotaping of the operations or procedures to be performed, including appropriate portions of my body for medical, scientific, or educational purposes, provided my identity is not revealed by the pictures or by descriptive texts accompanying them. If the procedure has been photographed/videotaped, the surgeon will obtain the original picture, image, videotape or CD. The hospital will not be responsible for storage, release or maintenance of the picture, image, tape or CD.    7.   I consent to the presence of a  or observers in the operating room as deemed necessary by my physician or their designees. 8.   I recognize that in the event my procedure results in extended X-Ray/fluoroscopy time, I may develop a skin reaction. 9.  If I have a Do Not Attempt Resuscitation (DNAR) order in place, that status will be suspended while in the operating room, procedural suite, and during the recovery period unless otherwise explicitly stated by me (or a person authorized to consent on my behalf). The surgeon or my attending physician will determine when the applicable recovery period ends for purposes of reinstating the DNAR order. 10. Patients having a sterilization procedure: I understand that if the procedure is successful the results will be permanent and it will therefore be impossible for me to inseminate, conceive, or bear children. I also understand that the procedure is intended to result in sterility, although the result has not been guaranteed. 11. I acknowledge that my physician has explained sedation/analgesia administration to me including the risk and benefits I consent to the administration of sedation/analgesia as may be necessary or desirable in the judgment of my physician.     I CERTIFY THAT I HAVE READ AND FULLY UNDERSTAND THE ABOVE CONSENT TO OPERATION and/or OTHER PROCEDURE.        ____________________________________       _________________________________      ______________________________  Signature of Patient         Signature of Responsible Person        Printed Name of Responsible Person        ____________________________________      _________________________________      ______________________________       Signature of Witness          Relationship to Patient                       Date                                       Time  Patient Name: Allan Lackey     : 10/20/1945                 Printed: 2023      Medical Record #: [id number]                      Page 1 of 1